# Patient Record
Sex: FEMALE | Race: WHITE | Employment: FULL TIME | URBAN - METROPOLITAN AREA
[De-identification: names, ages, dates, MRNs, and addresses within clinical notes are randomized per-mention and may not be internally consistent; named-entity substitution may affect disease eponyms.]

---

## 2017-04-21 PROBLEM — F41.9 ANXIETY: Status: ACTIVE | Noted: 2017-04-21

## 2017-07-07 ENCOUNTER — HOSPITAL ENCOUNTER (OUTPATIENT)
Dept: MAMMOGRAPHY | Age: 52
Discharge: HOME | End: 2017-07-07
Attending: FAMILY MEDICINE
Payer: COMMERCIAL

## 2017-07-07 DIAGNOSIS — Z12.39 SCREENING FOR BREAST CANCER: ICD-10-CM

## 2017-07-07 PROCEDURE — 77067 SCR MAMMO BI INCL CAD: CPT

## 2017-07-28 ENCOUNTER — HOSPITAL ENCOUNTER (OUTPATIENT)
Dept: LAB | Age: 52
Discharge: HOME | End: 2017-07-28

## 2017-07-28 PROCEDURE — 88305 TISSUE EXAM BY PATHOLOGIST: CPT | Performed by: INTERNAL MEDICINE

## 2018-07-20 ENCOUNTER — HOSPITAL ENCOUNTER (OUTPATIENT)
Dept: MAMMOGRAPHY | Age: 53
Discharge: HOME | End: 2018-07-20
Attending: FAMILY MEDICINE
Payer: COMMERCIAL

## 2018-07-20 DIAGNOSIS — Z12.39 SCREENING FOR BREAST CANCER: ICD-10-CM

## 2018-07-20 PROCEDURE — 77067 SCR MAMMO BI INCL CAD: CPT

## 2019-09-13 ENCOUNTER — HOSPITAL ENCOUNTER (OUTPATIENT)
Dept: MAMMOGRAPHY | Age: 54
Discharge: HOME | End: 2019-09-13
Attending: FAMILY MEDICINE
Payer: COMMERCIAL

## 2019-09-13 DIAGNOSIS — Z12.39 SCREENING FOR MALIGNANT NEOPLASM OF BREAST: ICD-10-CM

## 2019-09-13 PROCEDURE — 77067 SCR MAMMO BI INCL CAD: CPT

## 2019-09-19 ENCOUNTER — HOSPITAL ENCOUNTER (OUTPATIENT)
Dept: MAMMOGRAPHY | Age: 54
Discharge: HOME | End: 2019-09-19
Attending: FAMILY MEDICINE
Payer: COMMERCIAL

## 2019-09-19 DIAGNOSIS — R92.8 ABNORMAL SCREENING MAMMOGRAM: ICD-10-CM

## 2019-09-19 PROCEDURE — 77065 DX MAMMO INCL CAD UNI: CPT

## 2021-11-29 ENCOUNTER — HOSPITAL ENCOUNTER (EMERGENCY)
Age: 56
Discharge: LEFT BEFORE DISCHARGE | End: 2021-11-29
Payer: COMMERCIAL

## 2021-11-29 PROCEDURE — 75810000275 HC EMERGENCY DEPT VISIT NO LEVEL OF CARE

## 2021-12-27 ENCOUNTER — HOSPITAL ENCOUNTER (OUTPATIENT)
Dept: MAMMOGRAPHY | Age: 56
Discharge: HOME | End: 2021-12-27
Attending: FAMILY MEDICINE
Payer: COMMERCIAL

## 2021-12-27 DIAGNOSIS — Z12.31 SCREENING MAMMOGRAM FOR HIGH-RISK PATIENT: ICD-10-CM

## 2021-12-27 PROCEDURE — 77067 SCR MAMMO BI INCL CAD: CPT

## 2022-03-19 PROBLEM — F41.9 ANXIETY: Status: ACTIVE | Noted: 2017-04-21

## 2022-10-11 ENCOUNTER — TELEPHONE (OUTPATIENT)
Dept: FAMILY MEDICINE CLINIC | Facility: CLINIC | Age: 57
End: 2022-10-11

## 2022-10-11 NOTE — TELEPHONE ENCOUNTER
Pt called stating that she has a sinus infection. No fever, no sore throat, just a lot of congestion with yellow/green mucus. Pt was wanting something to be called in. Attempted to call pt to inform she needs to be evaluated here ot urgent care. Unable to leave message. Can pt do a Evisit? Or urgent care.

## 2022-11-14 NOTE — TELEPHONE ENCOUNTER
----- Message from Anya Scott sent at 11/14/2022  9:07 AM EST -----  Subject: Refill Request    QUESTIONS  Name of Medication? sertraline (ZOLOFT) 50 MG tablet  Patient-reported dosage and instructions? 1 per day  How many days do you have left? 10  Preferred Pharmacy? [facility] [address] phone number (if available)? [phone]  ---------------------------------------------------------------------------  --------------,  Name of Medication? traZODone (DESYREL) 150 MG tablet  Patient-reported dosage and instructions? 1 per day  How many days do you have left? 10  Preferred Pharmacy? [facility] [address] phone number (if available)? [phone]  ---------------------------------------------------------------------------  --------------  Sree MORENO  What is the best way for the office to contact you? OK to leave message on   voicemail  Preferred Call Back Phone Number? [phone]  ---------------------------------------------------------------------------  --------------  SCRIPT ANSWERS  Relationship to Patient?  Self

## 2022-11-23 ENCOUNTER — TELEPHONE (OUTPATIENT)
Dept: FAMILY MEDICINE CLINIC | Facility: CLINIC | Age: 57
End: 2022-11-23

## 2022-11-23 RX ORDER — TRAZODONE HYDROCHLORIDE 150 MG/1
150 TABLET ORAL NIGHTLY
Qty: 30 TABLET | Refills: 0 | Status: SHIPPED | OUTPATIENT
Start: 2022-11-23

## 2022-11-23 NOTE — TELEPHONE ENCOUNTER
Pharmacist with [facility] called asking for a new Rx for the Zoloft. States that they last Rx has 2 different directions on it.

## 2022-12-05 ENCOUNTER — NURSE ONLY (OUTPATIENT)
Dept: FAMILY MEDICINE CLINIC | Facility: CLINIC | Age: 57
End: 2022-12-05

## 2022-12-05 ENCOUNTER — OFFICE VISIT (OUTPATIENT)
Dept: FAMILY MEDICINE CLINIC | Facility: CLINIC | Age: 57
End: 2022-12-05
Payer: COMMERCIAL

## 2022-12-05 VITALS
TEMPERATURE: 97.6 F | OXYGEN SATURATION: 92 % | WEIGHT: 188.2 LBS | BODY MASS INDEX: 34.63 KG/M2 | RESPIRATION RATE: 16 BRPM | DIASTOLIC BLOOD PRESSURE: 81 MMHG | SYSTOLIC BLOOD PRESSURE: 137 MMHG | HEIGHT: 62 IN | HEART RATE: 72 BPM

## 2022-12-05 DIAGNOSIS — E78.00 HIGH CHOLESTEROL: ICD-10-CM

## 2022-12-05 DIAGNOSIS — Z13.0 SCREENING, ANEMIA, DEFICIENCY, IRON: ICD-10-CM

## 2022-12-05 DIAGNOSIS — Z13.1 SCREENING FOR DIABETES MELLITUS: ICD-10-CM

## 2022-12-05 DIAGNOSIS — F41.9 ANXIETY: Primary | ICD-10-CM

## 2022-12-05 DIAGNOSIS — F51.01 PRIMARY INSOMNIA: ICD-10-CM

## 2022-12-05 DIAGNOSIS — Z12.31 ENCOUNTER FOR SCREENING MAMMOGRAM FOR MALIGNANT NEOPLASM OF BREAST: ICD-10-CM

## 2022-12-05 LAB
BASOPHILS # BLD: 0.1 K/UL (ref 0–0.2)
BASOPHILS NFR BLD: 1 % (ref 0–2)
DIFFERENTIAL METHOD BLD: ABNORMAL
EOSINOPHIL # BLD: 0.1 K/UL (ref 0–0.8)
EOSINOPHIL NFR BLD: 2 % (ref 0.5–7.8)
ERYTHROCYTE [DISTWIDTH] IN BLOOD BY AUTOMATED COUNT: 11.6 % (ref 11.9–14.6)
HCT VFR BLD AUTO: 45 % (ref 35.8–46.3)
HGB BLD-MCNC: 14.8 G/DL (ref 11.7–15.4)
IMM GRANULOCYTES # BLD AUTO: 0 K/UL (ref 0–0.5)
IMM GRANULOCYTES NFR BLD AUTO: 0 % (ref 0–5)
LYMPHOCYTES # BLD: 2.5 K/UL (ref 0.5–4.6)
LYMPHOCYTES NFR BLD: 33 % (ref 13–44)
MCH RBC QN AUTO: 32.1 PG (ref 26.1–32.9)
MCHC RBC AUTO-ENTMCNC: 32.9 G/DL (ref 31.4–35)
MCV RBC AUTO: 97.6 FL (ref 82–102)
MONOCYTES # BLD: 0.4 K/UL (ref 0.1–1.3)
MONOCYTES NFR BLD: 6 % (ref 4–12)
NEUTS SEG # BLD: 4.3 K/UL (ref 1.7–8.2)
NEUTS SEG NFR BLD: 58 % (ref 43–78)
NRBC # BLD: 0 K/UL (ref 0–0.2)
PLATELET # BLD AUTO: 271 K/UL (ref 150–450)
PMV BLD AUTO: 10.4 FL (ref 9.4–12.3)
RBC # BLD AUTO: 4.61 M/UL (ref 4.05–5.2)
WBC # BLD AUTO: 7.4 K/UL (ref 4.3–11.1)

## 2022-12-05 PROCEDURE — 99214 OFFICE O/P EST MOD 30 MIN: CPT | Performed by: FAMILY MEDICINE

## 2022-12-05 RX ORDER — TRAZODONE HYDROCHLORIDE 150 MG/1
150 TABLET ORAL NIGHTLY
Qty: 90 TABLET | Refills: 3 | Status: SHIPPED | OUTPATIENT
Start: 2022-12-05

## 2022-12-05 ASSESSMENT — PATIENT HEALTH QUESTIONNAIRE - PHQ9
SUM OF ALL RESPONSES TO PHQ QUESTIONS 1-9: 0
1. LITTLE INTEREST OR PLEASURE IN DOING THINGS: 0
SUM OF ALL RESPONSES TO PHQ QUESTIONS 1-9: 0
2. FEELING DOWN, DEPRESSED OR HOPELESS: 0
SUM OF ALL RESPONSES TO PHQ9 QUESTIONS 1 & 2: 0

## 2022-12-05 NOTE — PROGRESS NOTES
[address] [facility] [address], [address]  Phone: [phone] Fax [phone]  Ryley Alonso MD  2022           Ms. Oliveira  is a 61y.o.  year old  female patient who comes in for refills. She is doing very well on the Zoloft for anxiety. She also takes trazodone for sleep and it really helps her sleep. Her health has been good overall. No depression. No thoughts of suicide. Ms. Joselin Lyons  has  has a past medical history of Menopause and PMS (premenstrual syndrome). Ms. Joselin Lyons  has  has a past surgical history that includes orthopedic surgery (Right, 4/3/15);  section (); and gyn. Ms. Joselin Lyons   Current Outpatient Medications   Medication Sig Dispense Refill    sertraline (ZOLOFT) 50 MG tablet One and a half tabs po qd 90 tablet 3    traZODone (DESYREL) 150 MG tablet Take 1 tablet by mouth nightly One-half to one po qhs prn 90 tablet 3     No current facility-administered medications for this visit. Ms. Freya Jesus History     Socioeconomic History    Marital status:      Spouse name: None    Number of children: None    Years of education: None    Highest education level: None   Tobacco Use    Smoking status: Never    Smokeless tobacco: Never   Substance and Sexual Activity    Alcohol use: No     Alcohol/week: 0.0 standard drinks    Drug use: No       Ms. Oliveira   Family History   Problem Relation Age of Onset    Heart Disease Father     Breast Cancer Paternal Aunt 48    Thyroid Disease Mother             Ms. Joselin Lyons  has the following allergies: Allergies   Allergen Reactions    Nitrofurantoin Other (See Comments)       /81   Pulse 72   Temp 97.6 °F (36.4 °C)   Resp 16   Ht 5' 2\" (1.575 m)   Wt 188 lb 3.2 oz (85.4 kg)   SpO2 92%   BMI 34.42 kg/m²     HEENT: Normocephalic, atraumatic, pupils equal and reactive to light. Neck: Supple, no masses or thyromegaly.   Lungs: clear to auscultation bilaterally. CV: regular rate and rhythm, without murmurs, rubs, or gallops  Ext: No lower extremity edema. Ana Gupta was seen today for anxiety and medication refill. Diagnoses and all orders for this visit:    Anxiety  -     sertraline (ZOLOFT) 50 MG tablet; One and a half tabs po qd    Encounter for screening mammogram for malignant neoplasm of breast  -     [facility]ERR[facility] DIGITAL SCREEN W OR WO CAD BILATERAL; Future    Primary insomnia  -     traZODone (DESYREL) 150 MG tablet; Take 1 tablet by mouth nightly One-half to one po qhs prn    High cholesterol  -     Lipid Panel; Future    Screening for diabetes mellitus  -     Comprehensive Metabolic Panel; Future    Screening, anemia, deficiency, iron  -     CBC with Auto Differential; Future    She will return for Pap smear. Go for labs today.   Heidi Chambers MD

## 2022-12-06 LAB
ALBUMIN SERPL-MCNC: 4.1 G/DL (ref 3.5–5)
ALBUMIN/GLOB SERPL: 1.5 {RATIO} (ref 0.4–1.6)
ALP SERPL-CCNC: 125 U/L (ref 50–136)
ALT SERPL-CCNC: 37 U/L (ref 12–65)
ANION GAP SERPL CALC-SCNC: 3 MMOL/L (ref 2–11)
AST SERPL-CCNC: 22 U/L (ref 15–37)
BILIRUB SERPL-MCNC: 0.5 MG/DL (ref 0.2–1.1)
BUN SERPL-MCNC: 9 MG/DL (ref 6–23)
CALCIUM SERPL-MCNC: 9.1 MG/DL (ref 8.3–10.4)
CHLORIDE SERPL-SCNC: 104 MMOL/L (ref 101–110)
CHOLEST SERPL-MCNC: 218 MG/DL
CO2 SERPL-SCNC: 31 MMOL/L (ref 21–32)
CREAT SERPL-MCNC: 0.6 MG/DL (ref 0.6–1)
GLOBULIN SER CALC-MCNC: 2.7 G/DL (ref 2.8–4.5)
GLUCOSE SERPL-MCNC: 91 MG/DL (ref 65–100)
HDLC SERPL-MCNC: 52 MG/DL (ref 40–60)
HDLC SERPL: 4.2 {RATIO}
LDLC SERPL CALC-MCNC: 145.6 MG/DL
POTASSIUM SERPL-SCNC: 4.4 MMOL/L (ref 3.5–5.1)
PROT SERPL-MCNC: 6.8 G/DL (ref 6.3–8.2)
SODIUM SERPL-SCNC: 138 MMOL/L (ref 133–143)
TRIGL SERPL-MCNC: 102 MG/DL (ref 35–150)
VLDLC SERPL CALC-MCNC: 20.4 MG/DL (ref 6–23)

## 2022-12-06 NOTE — RESULT ENCOUNTER NOTE
Let patient know labs normal except cholesterol is slightly high. Watch fats in diet and recheck one year.

## 2022-12-27 DIAGNOSIS — F51.01 PRIMARY INSOMNIA: ICD-10-CM

## 2022-12-27 RX ORDER — TRAZODONE HYDROCHLORIDE 150 MG/1
150 TABLET ORAL NIGHTLY
Qty: 90 TABLET | Refills: 3 | Status: SHIPPED | OUTPATIENT
Start: 2022-12-27

## 2022-12-27 NOTE — TELEPHONE ENCOUNTER
Pharmacist with [facility] called stating that they are needing a new Rx for Trazodone to be sent in for pt. Last Rx has 2 different directions on it.

## 2023-01-10 ENCOUNTER — OFFICE VISIT (OUTPATIENT)
Dept: FAMILY MEDICINE CLINIC | Facility: CLINIC | Age: 58
End: 2023-01-10
Payer: COMMERCIAL

## 2023-01-10 VITALS
TEMPERATURE: 97.2 F | HEIGHT: 62 IN | DIASTOLIC BLOOD PRESSURE: 78 MMHG | SYSTOLIC BLOOD PRESSURE: 140 MMHG | OXYGEN SATURATION: 95 % | WEIGHT: 184 LBS | HEART RATE: 69 BPM | BODY MASS INDEX: 33.86 KG/M2 | RESPIRATION RATE: 16 BRPM

## 2023-01-10 DIAGNOSIS — R39.15 URINARY URGENCY: ICD-10-CM

## 2023-01-10 DIAGNOSIS — Z00.00 ROUTINE GENERAL MEDICAL EXAMINATION AT A HEALTH CARE FACILITY: Primary | ICD-10-CM

## 2023-01-10 DIAGNOSIS — Z12.4 SCREENING FOR MALIGNANT NEOPLASM OF CERVIX: ICD-10-CM

## 2023-01-10 DIAGNOSIS — N32.81 OAB (OVERACTIVE BLADDER): ICD-10-CM

## 2023-01-10 LAB
APPEARANCE UR: CLEAR
BACTERIA URNS QL MICRO: NEGATIVE /HPF
BILIRUB UR QL: NEGATIVE
CASTS URNS QL MICRO: ABNORMAL /LPF (ref 0–2)
COLOR UR: ABNORMAL
EPI CELLS #/AREA URNS HPF: ABNORMAL /HPF (ref 0–5)
GLUCOSE UR STRIP.AUTO-MCNC: NEGATIVE MG/DL
HGB UR QL STRIP: NEGATIVE
KETONES UR QL STRIP.AUTO: NEGATIVE MG/DL
LEUKOCYTE ESTERASE UR QL STRIP.AUTO: ABNORMAL
MUCOUS THREADS URNS QL MICRO: 0 /LPF
NITRITE UR QL STRIP.AUTO: NEGATIVE
PH UR STRIP: 7 (ref 5–9)
PROT UR STRIP-MCNC: NEGATIVE MG/DL
RBC #/AREA URNS HPF: ABNORMAL /HPF (ref 0–5)
SP GR UR REFRACTOMETRY: 1.02 (ref 1–1.02)
URINE CULTURE IF INDICATED: ABNORMAL
UROBILINOGEN UR QL STRIP.AUTO: 0.2 EU/DL (ref 0.2–1)
WBC URNS QL MICRO: ABNORMAL /HPF (ref 0–4)

## 2023-01-10 PROCEDURE — 99396 PREV VISIT EST AGE 40-64: CPT | Performed by: FAMILY MEDICINE

## 2023-01-10 PROCEDURE — 99214 OFFICE O/P EST MOD 30 MIN: CPT | Performed by: FAMILY MEDICINE

## 2023-01-10 RX ORDER — TOLTERODINE 4 MG/1
4 CAPSULE, EXTENDED RELEASE ORAL DAILY
Qty: 90 CAPSULE | Refills: 3 | Status: SHIPPED | OUTPATIENT
Start: 2023-01-10

## 2023-01-10 ASSESSMENT — PATIENT HEALTH QUESTIONNAIRE - PHQ9
SUM OF ALL RESPONSES TO PHQ QUESTIONS 1-9: 0
2. FEELING DOWN, DEPRESSED OR HOPELESS: 0
SUM OF ALL RESPONSES TO PHQ9 QUESTIONS 1 & 2: 0
SUM OF ALL RESPONSES TO PHQ QUESTIONS 1-9: 0
1. LITTLE INTEREST OR PLEASURE IN DOING THINGS: 0

## 2023-01-10 NOTE — PROGRESS NOTES
[address] [facility] [address]  Phone: [phone] Fax [phone]  Emerson Wagner MD          Ms. Oliveira  is a 61y.o.  year old  female patient who comes in for complete physical. She has been doing well. No fatigue, weight loss, weight gain. No shortness of breath, chest pain, palpitations. No abdominal pain, diarrhea, or constipation. No abnormal skin changes. Has  been working on diet and exercise. Feels well. ROS:  Feeling well. No dyspnea or chest pain on exertion. No abdominal pain, change in bowel habits, black or bloody stools. GYN ROS: no breast pain or new or enlarging lumps on self exam. Menopausal symptoms: none. No neurological complaints. Last DEXA scan and T-score: none  Last Colonoscopy:   Last Mammogram:   Last PAP:     The past month she has noticed some urinary urgency. She has noticed a little leakage as well. No fevers or burning. She has never had trouble with her bladder before. Ms. Herrera Gerard  has  has a past medical history of Menopause and PMS (premenstrual syndrome). Ms. Herrera Gerard  has  has a past surgical history that includes orthopedic surgery (Right, 4/3/15);  section (); and gyn. Ms. Herrera Gerard   Current Outpatient Medications   Medication Sig Dispense Refill    tolterodine (DETROL LA) 4 MG extended release capsule Take 1 capsule by mouth daily 90 capsule 3    traZODone (DESYREL) 150 MG tablet Take 1 tablet by mouth nightly One-half to one po qhs prn 90 tablet 3    sertraline (ZOLOFT) 50 MG tablet One and a half tabs po qd 90 tablet 3     No current facility-administered medications for this visit. Ms. Herrera Gerard family history includes Breast Cancer (age of onset: 48) in her paternal aunt; Heart Disease in her father; Thyroid Disease in her mother.       Ms. Zabrina Tinsleyut History     Socioeconomic History    Marital status:      Spouse name: Not on file    Number of children: Not on file    Years of education: Not on file    Highest education level: Not on file   Occupational History    Not on file   Tobacco Use    Smoking status: Never    Smokeless tobacco: Never   Substance and Sexual Activity    Alcohol use: No     Alcohol/week: 0.0 standard drinks    Drug use: No    Sexual activity: Not on file   Other Topics Concern    Not on file   Social History Narrative    Not on file     Social Determinants of Health     Financial Resource Strain: Not on file   Food Insecurity: Not on file   Transportation Needs: Not on file   Physical Activity: Not on file   Stress: Not on file   Social Connections: Not on file   Intimate Partner Violence: Not on file   Housing Stability: Not on file         Ms. Oliveira  has the following allergies: Allergies   Allergen Reactions    Nitrofurantoin Other (See Comments)       BP (!) 140/78   Pulse 69   Temp 97.2 °F (36.2 °C)   Resp 16   Ht 5' 2\" (1.575 m)   Wt 184 lb (83.5 kg)   SpO2 95%   BMI 33.65 kg/m²     General: alert, oriented x 3, pleasant. HEENT: Normocephalic, atraumatic, pupils equal and reactive to light. Neck: Supple, no masses or thyromegaly or lymphadenopathy. Lungs: clear to auscultation bilaterally without wheezing or rhonchi. CV: regular rate and rhythm, without murmurs, rubs, or gallops. Abdomen: soft, nontender, nondistended, no masses. No hepatosplenomegaly. No abdominal bruits. Ext: No lower extremity edema. No lesions about the feet. Breasts: no masses, no nipple discharge, no axillary lymphadenopathy of either breast.  Pelvic: normal female external genitalia, normal cervix and vagina visually. Pap smear done. Skin: no lesions. Jamal Leo was seen today for gynecologic exam.    Diagnoses and all orders for this visit:    Routine general medical examination at a health care facility    Screening for malignant neoplasm of cervix  -     IGP, Aptima HPV;  Future    Urinary urgency  -     Cancel: AMB POC URINALYSIS DIP STICK AUTO W/O MICRO  -     Urinalysis with Reflex to Culture; Future    OAB (overactive bladder)  -     tolterodine (DETROL LA) 4 MG extended release capsule; Take 1 capsule by mouth daily      Check urine today. Treat if needed for infection. If no infection consider overactive bladder treatment. Detrol LA given and gone over. She will be sure to go for mammogram.  She will consider the shingles vaccine and the COVID-vaccine and flu vaccine. Blood work was done last month and was gone over with the patient. We will do it again next year. 30 minutes of moderate aerobic exercise 5 days a week and green leafy vegetables daily for cardiovascular risk reduction.       Uma Zavaleta MD

## 2023-01-13 LAB
CYTOLOGIST CVX/VAG CYTO: NORMAL
CYTOLOGY CVX/VAG DOC THIN PREP: NORMAL
HPV APTIMA: NEGATIVE
Lab: NORMAL
PATH REPORT.FINAL DX SPEC: NORMAL
STAT OF ADQ CVX/VAG CYTO-IMP: NORMAL

## 2023-02-10 ENCOUNTER — HOSPITAL ENCOUNTER (OUTPATIENT)
Dept: MAMMOGRAPHY | Age: 58
Discharge: HOME | End: 2023-02-10
Payer: COMMERCIAL

## 2023-02-10 DIAGNOSIS — Z12.31 ENCOUNTER FOR SCREENING MAMMOGRAM FOR MALIGNANT NEOPLASM OF BREAST: ICD-10-CM

## 2023-02-10 PROCEDURE — 77067 SCR MAMMO BI INCL CAD: CPT

## 2023-05-24 ENCOUNTER — OFFICE VISIT (OUTPATIENT)
Dept: FAMILY MEDICINE CLINIC | Facility: CLINIC | Age: 58
End: 2023-05-24
Payer: COMMERCIAL

## 2023-05-24 VITALS
RESPIRATION RATE: 18 BRPM | SYSTOLIC BLOOD PRESSURE: 147 MMHG | TEMPERATURE: 98.3 F | HEIGHT: 62 IN | HEART RATE: 71 BPM | OXYGEN SATURATION: 95 % | BODY MASS INDEX: 34.6 KG/M2 | DIASTOLIC BLOOD PRESSURE: 83 MMHG | WEIGHT: 188 LBS

## 2023-05-24 DIAGNOSIS — M76.32 ILIOTIBIAL BAND TENDINITIS OF LEFT SIDE: ICD-10-CM

## 2023-05-24 DIAGNOSIS — M70.62 TROCHANTERIC BURSITIS OF LEFT HIP: Primary | ICD-10-CM

## 2023-05-24 PROCEDURE — 99213 OFFICE O/P EST LOW 20 MIN: CPT | Performed by: PHYSICIAN ASSISTANT

## 2023-05-24 RX ORDER — NAPROXEN 500 MG/1
500 TABLET ORAL 2 TIMES DAILY WITH MEALS
Qty: 30 TABLET | Refills: 1 | Status: SHIPPED | OUTPATIENT
Start: 2023-05-24

## 2023-05-24 SDOH — ECONOMIC STABILITY: FOOD INSECURITY: WITHIN THE PAST 12 MONTHS, YOU WORRIED THAT YOUR FOOD WOULD RUN OUT BEFORE YOU GOT MONEY TO BUY MORE.: NEVER TRUE

## 2023-05-24 SDOH — ECONOMIC STABILITY: FOOD INSECURITY: WITHIN THE PAST 12 MONTHS, THE FOOD YOU BOUGHT JUST DIDN'T LAST AND YOU DIDN'T HAVE MONEY TO GET MORE.: NEVER TRUE

## 2023-05-24 SDOH — ECONOMIC STABILITY: HOUSING INSECURITY
IN THE LAST 12 MONTHS, WAS THERE A TIME WHEN YOU DID NOT HAVE A STEADY PLACE TO SLEEP OR SLEPT IN A SHELTER (INCLUDING NOW)?: NO

## 2023-05-24 SDOH — ECONOMIC STABILITY: INCOME INSECURITY: HOW HARD IS IT FOR YOU TO PAY FOR THE VERY BASICS LIKE FOOD, HOUSING, MEDICAL CARE, AND HEATING?: NOT HARD AT ALL

## 2023-05-24 ASSESSMENT — ENCOUNTER SYMPTOMS
BACK PAIN: 0
COLOR CHANGE: 0

## 2023-05-24 ASSESSMENT — PATIENT HEALTH QUESTIONNAIRE - PHQ9
SUM OF ALL RESPONSES TO PHQ QUESTIONS 1-9: 0
SUM OF ALL RESPONSES TO PHQ9 QUESTIONS 1 & 2: 0
SUM OF ALL RESPONSES TO PHQ QUESTIONS 1-9: 0
1. LITTLE INTEREST OR PLEASURE IN DOING THINGS: 0
2. FEELING DOWN, DEPRESSED OR HOPELESS: 0

## 2023-05-24 NOTE — PATIENT INSTRUCTIONS
*Take the antiinflammatory as prescribed for the next 2 weeks. *Recommend alternating ice (for 10 min) and heat (for 30 min), 3-4 times a day as able/needed. *Try the stretches daily.

## 2023-05-24 NOTE — PROGRESS NOTES
[facility] [address]  Phone [phone]      Patient: Harrison Hart  YOB: 1965  Age 61 y.o. Sex female  Medical Record:  [id number]  Visit Date: 05/24/23  Author:  Alyssa Hernandez PA-C    Family Practice Clinic Note    Chief Complaint   Patient presents with    Hip Pain     Left hip, started the day before yesterday, does not recall doing anything in particular, tender to touch, denies previous injury to left hip. History of Present Illness  This is a 62 yo female who presents today with complaints of left lateral hip pain which has been present now for the past 2 to 3 days. She denies any preceding injury or trauma. Notes that the pain is primarily at the lateral aspect of the hip and into the buttock area. She denies radiation of the pain down the leg. Denies numbness, tingling or weakness of the lower extremity. She has not noted any overlying rash or skin changes. Notes tenderness to palpation of the lateral hip area. Notes that she sits a lot at work, using a computer. Admits that she will often sit crisscross legged in her chair at work (with her feet under her in the seat). Past History:    Past Medical history   Past Medical History:   Diagnosis Date    Menopause     PMS (premenstrual syndrome) 10/29/2013    Weight gain       Current Problem List:   Patient Active Problem List   Diagnosis    Anxiety    Hx of abnormal cervical Pap smear    Low serum vitamin D    Primary insomnia       Current Medications: .   Current Outpatient Medications   Medication Sig Dispense Refill    traZODone (DESYREL) 150 MG tablet Take 1 tablet by mouth nightly One-half to one po qhs prn 90 tablet 3    sertraline (ZOLOFT) 50 MG tablet One and a half tabs po qd 90 tablet 3    tolterodine (DETROL LA) 4 MG extended release capsule Take 1 capsule by mouth daily (Patient not taking: Reported on 5/24/2023) 90 capsule 3     No current

## 2023-09-25 ENCOUNTER — OFFICE VISIT (OUTPATIENT)
Dept: FAMILY MEDICINE CLINIC | Facility: CLINIC | Age: 58
End: 2023-09-25
Payer: COMMERCIAL

## 2023-09-25 VITALS
OXYGEN SATURATION: 97 % | HEIGHT: 62 IN | HEART RATE: 81 BPM | SYSTOLIC BLOOD PRESSURE: 128 MMHG | RESPIRATION RATE: 16 BRPM | TEMPERATURE: 97.6 F | WEIGHT: 185 LBS | DIASTOLIC BLOOD PRESSURE: 67 MMHG | BODY MASS INDEX: 34.04 KG/M2

## 2023-09-25 DIAGNOSIS — R05.1 ACUTE COUGH: ICD-10-CM

## 2023-09-25 DIAGNOSIS — H66.003 NON-RECURRENT ACUTE SUPPURATIVE OTITIS MEDIA OF BOTH EARS WITHOUT SPONTANEOUS RUPTURE OF TYMPANIC MEMBRANES: ICD-10-CM

## 2023-09-25 DIAGNOSIS — U07.1 COVID: Primary | ICD-10-CM

## 2023-09-25 DIAGNOSIS — R50.9 FEVER, UNSPECIFIED FEVER CAUSE: ICD-10-CM

## 2023-09-25 DIAGNOSIS — R11.0 NAUSEA: ICD-10-CM

## 2023-09-25 LAB
EXP DATE SOLUTION: NORMAL
EXP DATE SWAB: NORMAL
EXPIRATION DATE: NORMAL
LOT NUMBER POC: NORMAL
LOT NUMBER SOLUTION: NORMAL
LOT NUMBER SWAB: NORMAL
SARS-COV-2 RNA, POC: POSITIVE

## 2023-09-25 PROCEDURE — 87635 SARS-COV-2 COVID-19 AMP PRB: CPT | Performed by: FAMILY MEDICINE

## 2023-09-25 PROCEDURE — 99214 OFFICE O/P EST MOD 30 MIN: CPT | Performed by: FAMILY MEDICINE

## 2023-09-25 RX ORDER — BENZONATATE 100 MG/1
CAPSULE ORAL
Qty: 20 CAPSULE | Refills: 0 | Status: SHIPPED | OUTPATIENT
Start: 2023-09-25

## 2023-09-25 RX ORDER — ONDANSETRON 4 MG/1
4 TABLET, ORALLY DISINTEGRATING ORAL 3 TIMES DAILY PRN
Qty: 21 TABLET | Refills: 0 | Status: SHIPPED | OUTPATIENT
Start: 2023-09-25

## 2023-09-25 RX ORDER — AMOXICILLIN 875 MG/1
875 TABLET, COATED ORAL 2 TIMES DAILY
Qty: 20 TABLET | Refills: 0 | Status: SHIPPED | OUTPATIENT
Start: 2023-09-25 | End: 2023-10-05

## 2023-09-25 ASSESSMENT — PATIENT HEALTH QUESTIONNAIRE - PHQ9
SUM OF ALL RESPONSES TO PHQ QUESTIONS 1-9: 0
SUM OF ALL RESPONSES TO PHQ9 QUESTIONS 1 & 2: 0
1. LITTLE INTEREST OR PLEASURE IN DOING THINGS: 0
SUM OF ALL RESPONSES TO PHQ QUESTIONS 1-9: 0
SUM OF ALL RESPONSES TO PHQ QUESTIONS 1-9: 0
2. FEELING DOWN, DEPRESSED OR HOPELESS: 0
SUM OF ALL RESPONSES TO PHQ QUESTIONS 1-9: 0

## 2024-01-03 ENCOUNTER — TELEPHONE (OUTPATIENT)
Dept: FAMILY MEDICINE CLINIC | Facility: CLINIC | Age: 59
End: 2024-01-03

## 2024-01-03 DIAGNOSIS — E78.00 HIGH CHOLESTEROL: ICD-10-CM

## 2024-01-03 DIAGNOSIS — Z13.0 SCREENING, ANEMIA, DEFICIENCY, IRON: ICD-10-CM

## 2024-01-03 DIAGNOSIS — Z13.1 SCREENING FOR DIABETES MELLITUS: Primary | ICD-10-CM

## 2024-01-05 ENCOUNTER — NURSE ONLY (OUTPATIENT)
Dept: FAMILY MEDICINE CLINIC | Facility: CLINIC | Age: 59
End: 2024-01-05

## 2024-01-05 DIAGNOSIS — Z13.0 SCREENING, ANEMIA, DEFICIENCY, IRON: ICD-10-CM

## 2024-01-05 DIAGNOSIS — E78.00 HIGH CHOLESTEROL: ICD-10-CM

## 2024-01-05 DIAGNOSIS — Z13.1 SCREENING FOR DIABETES MELLITUS: ICD-10-CM

## 2024-01-05 LAB
ALBUMIN SERPL-MCNC: 3.6 G/DL (ref 3.5–5)
ALBUMIN/GLOB SERPL: 1.1 (ref 0.4–1.6)
ALP SERPL-CCNC: 129 U/L (ref 50–136)
ALT SERPL-CCNC: 31 U/L (ref 12–65)
ANION GAP SERPL CALC-SCNC: 1 MMOL/L (ref 2–11)
AST SERPL-CCNC: 24 U/L (ref 15–37)
BASOPHILS # BLD: 0.1 K/UL (ref 0–0.2)
BASOPHILS NFR BLD: 1 % (ref 0–2)
BILIRUB SERPL-MCNC: 0.7 MG/DL (ref 0.2–1.1)
BUN SERPL-MCNC: 7 MG/DL (ref 6–23)
CALCIUM SERPL-MCNC: 8.7 MG/DL (ref 8.3–10.4)
CHLORIDE SERPL-SCNC: 106 MMOL/L (ref 103–113)
CHOLEST SERPL-MCNC: 192 MG/DL
CO2 SERPL-SCNC: 31 MMOL/L (ref 21–32)
CREAT SERPL-MCNC: 0.7 MG/DL (ref 0.6–1)
DIFFERENTIAL METHOD BLD: ABNORMAL
EOSINOPHIL # BLD: 0.1 K/UL (ref 0–0.8)
EOSINOPHIL NFR BLD: 2 % (ref 0.5–7.8)
ERYTHROCYTE [DISTWIDTH] IN BLOOD BY AUTOMATED COUNT: 11.6 % (ref 11.9–14.6)
GLOBULIN SER CALC-MCNC: 3.3 G/DL (ref 2.8–4.5)
GLUCOSE SERPL-MCNC: 107 MG/DL (ref 65–100)
HCT VFR BLD AUTO: 44.9 % (ref 35.8–46.3)
HDLC SERPL-MCNC: 53 MG/DL (ref 40–60)
HDLC SERPL: 3.6
HGB BLD-MCNC: 14.6 G/DL (ref 11.7–15.4)
IMM GRANULOCYTES # BLD AUTO: 0 K/UL (ref 0–0.5)
IMM GRANULOCYTES NFR BLD AUTO: 0 % (ref 0–5)
LDLC SERPL CALC-MCNC: 123.8 MG/DL
LYMPHOCYTES # BLD: 2 K/UL (ref 0.5–4.6)
LYMPHOCYTES NFR BLD: 32 % (ref 13–44)
MCH RBC QN AUTO: 31.7 PG (ref 26.1–32.9)
MCHC RBC AUTO-ENTMCNC: 32.5 G/DL (ref 31.4–35)
MCV RBC AUTO: 97.6 FL (ref 82–102)
MONOCYTES # BLD: 0.4 K/UL (ref 0.1–1.3)
MONOCYTES NFR BLD: 7 % (ref 4–12)
NEUTS SEG # BLD: 3.6 K/UL (ref 1.7–8.2)
NEUTS SEG NFR BLD: 58 % (ref 43–78)
NRBC # BLD: 0 K/UL (ref 0–0.2)
PLATELET # BLD AUTO: 233 K/UL (ref 150–450)
PMV BLD AUTO: 10.5 FL (ref 9.4–12.3)
POTASSIUM SERPL-SCNC: 4.5 MMOL/L (ref 3.5–5.1)
PROT SERPL-MCNC: 6.9 G/DL (ref 6.3–8.2)
RBC # BLD AUTO: 4.6 M/UL (ref 4.05–5.2)
SODIUM SERPL-SCNC: 138 MMOL/L (ref 136–146)
TRIGL SERPL-MCNC: 76 MG/DL (ref 35–150)
VLDLC SERPL CALC-MCNC: 15.2 MG/DL (ref 6–23)
WBC # BLD AUTO: 6.1 K/UL (ref 4.3–11.1)

## 2024-01-12 ENCOUNTER — OFFICE VISIT (OUTPATIENT)
Dept: FAMILY MEDICINE CLINIC | Facility: CLINIC | Age: 59
End: 2024-01-12
Payer: COMMERCIAL

## 2024-01-12 VITALS
OXYGEN SATURATION: 96 % | HEART RATE: 87 BPM | SYSTOLIC BLOOD PRESSURE: 148 MMHG | BODY MASS INDEX: 34.23 KG/M2 | RESPIRATION RATE: 16 BRPM | HEIGHT: 62 IN | TEMPERATURE: 97.3 F | WEIGHT: 186 LBS | DIASTOLIC BLOOD PRESSURE: 81 MMHG

## 2024-01-12 DIAGNOSIS — F32.0 CURRENT MILD EPISODE OF MAJOR DEPRESSIVE DISORDER WITHOUT PRIOR EPISODE (HCC): ICD-10-CM

## 2024-01-12 DIAGNOSIS — Z12.31 ENCOUNTER FOR SCREENING MAMMOGRAM FOR MALIGNANT NEOPLASM OF BREAST: ICD-10-CM

## 2024-01-12 DIAGNOSIS — M54.32 LEFT SIDED SCIATICA: ICD-10-CM

## 2024-01-12 DIAGNOSIS — Z00.00 ROUTINE GENERAL MEDICAL EXAMINATION AT A HEALTH CARE FACILITY: Primary | ICD-10-CM

## 2024-01-12 DIAGNOSIS — F41.9 ANXIETY: ICD-10-CM

## 2024-01-12 DIAGNOSIS — F51.01 PRIMARY INSOMNIA: ICD-10-CM

## 2024-01-12 PROCEDURE — 99214 OFFICE O/P EST MOD 30 MIN: CPT | Performed by: FAMILY MEDICINE

## 2024-01-12 PROCEDURE — 99396 PREV VISIT EST AGE 40-64: CPT | Performed by: FAMILY MEDICINE

## 2024-01-12 RX ORDER — SERTRALINE HYDROCHLORIDE 100 MG/1
TABLET, FILM COATED ORAL
Qty: 90 TABLET | Refills: 3 | Status: SHIPPED | OUTPATIENT
Start: 2024-01-12

## 2024-01-12 RX ORDER — MELOXICAM 7.5 MG/1
7.5 TABLET ORAL DAILY
Qty: 30 TABLET | Refills: 3 | Status: SHIPPED | OUTPATIENT
Start: 2024-01-12

## 2024-01-12 RX ORDER — TRAZODONE HYDROCHLORIDE 150 MG/1
150 TABLET ORAL NIGHTLY
Qty: 90 TABLET | Refills: 3 | Status: SHIPPED | OUTPATIENT
Start: 2024-01-12

## 2024-01-12 ASSESSMENT — PATIENT HEALTH QUESTIONNAIRE - PHQ9
3. TROUBLE FALLING OR STAYING ASLEEP: 1
SUM OF ALL RESPONSES TO PHQ QUESTIONS 1-9: 6
7. TROUBLE CONCENTRATING ON THINGS, SUCH AS READING THE NEWSPAPER OR WATCHING TELEVISION: 0
SUM OF ALL RESPONSES TO PHQ QUESTIONS 1-9: 6
SUM OF ALL RESPONSES TO PHQ QUESTIONS 1-9: 6
SUM OF ALL RESPONSES TO PHQ9 QUESTIONS 1 & 2: 2
10. IF YOU CHECKED OFF ANY PROBLEMS, HOW DIFFICULT HAVE THESE PROBLEMS MADE IT FOR YOU TO DO YOUR WORK, TAKE CARE OF THINGS AT HOME, OR GET ALONG WITH OTHER PEOPLE: 1
6. FEELING BAD ABOUT YOURSELF - OR THAT YOU ARE A FAILURE OR HAVE LET YOURSELF OR YOUR FAMILY DOWN: 1
8. MOVING OR SPEAKING SO SLOWLY THAT OTHER PEOPLE COULD HAVE NOTICED. OR THE OPPOSITE, BEING SO FIGETY OR RESTLESS THAT YOU HAVE BEEN MOVING AROUND A LOT MORE THAN USUAL: 0
4. FEELING TIRED OR HAVING LITTLE ENERGY: 1
9. THOUGHTS THAT YOU WOULD BE BETTER OFF DEAD, OR OF HURTING YOURSELF: 0
2. FEELING DOWN, DEPRESSED OR HOPELESS: 1
5. POOR APPETITE OR OVEREATING: 1
1. LITTLE INTEREST OR PLEASURE IN DOING THINGS: 1
SUM OF ALL RESPONSES TO PHQ QUESTIONS 1-9: 6

## 2024-01-12 NOTE — PROGRESS NOTES
tablet; One po qd    Primary insomnia  -     traZODone (DESYREL) 150 MG tablet; Take 1 tablet by mouth nightly One-half to one po qhs prn    Encounter for screening mammogram for malignant neoplasm of breast  -     [facility] ELLIE DIGITAL SCREEN BILATERAL; Future    Left sided sciatica  -     meloxicam (MOBIC) 7.5 MG tablet; Take 1 tablet by mouth daily  -     BS - Physical Therapy, [facility]    Current mild episode of major depressive disorder without prior episode (HCC)  -     sertraline (ZOLOFT) 100 MG tablet; One po qd      Physical therapy and Mobic for her sciatica.  If not better months she is to let us know.    Declines vaccinations today.    Increase Zoloft.  Follow-up 3 months.  30 minutes of moderate aerobic exercise 5 days a week and green leafy vegetables daily for cardiovascular risk reduction.      Tracey Monge MD

## 2024-01-23 ENCOUNTER — HOSPITAL ENCOUNTER (OUTPATIENT)
Dept: PHYSICAL THERAPY | Age: 59
Setting detail: RECURRING SERIES
Discharge: HOME | End: 2024-01-26
Payer: COMMERCIAL

## 2024-01-23 DIAGNOSIS — M54.42 LOW BACK PAIN WITH LEFT-SIDED SCIATICA, UNSPECIFIED BACK PAIN LATERALITY, UNSPECIFIED CHRONICITY: Primary | ICD-10-CM

## 2024-01-23 DIAGNOSIS — R26.2 DIFFICULTY IN WALKING, NOT ELSEWHERE CLASSIFIED: ICD-10-CM

## 2024-01-23 DIAGNOSIS — M79.605 LEFT LEG PAIN: ICD-10-CM

## 2024-01-23 PROCEDURE — 97110 THERAPEUTIC EXERCISES: CPT

## 2024-01-23 PROCEDURE — 97140 MANUAL THERAPY 1/> REGIONS: CPT

## 2024-01-23 PROCEDURE — 97161 PT EVAL LOW COMPLEX 20 MIN: CPT

## 2024-01-23 ASSESSMENT — PAIN SCALES - GENERAL: PAINLEVEL_OUTOF10: 4

## 2024-01-23 NOTE — THERAPY EVALUATION
and does not extend below the knee. She exhibits lower back muscle tightness, point tenderness to L glute medius and L piriformis, and L hip/glute weakness. She is likely to benefit from PT intervention in order to reduce her pain and improve her quality of life in order to return to her baseline level of activity participation.  Therapy Problem List: (Impacting functional limitations):    Increased Pain, Decreased Strength, Decreased ROM, Decreased Functional Mobility, Decreased [place] with Home Exercise Program, Decreased Posture, Decreased Body Mechanics, and Decreased Activity Tolerance/Endurance*   Therapy Prognosis:   Good     Initial Assessment Complexity:   Low Complexity       PLAN   Effective Dates: 1/23/2024 TO Plan of Care/Certification Expiration Date: 03/05/24     Frequency/Duration: Plan Frequency: 2 visits per week for 6 weeks (Possible reduction to 1 visit per week pending symptom response to therapy)      Interventions Planned (Treatment may consist of any combination of the following):    Home Exercise Program (HEP), Manual Therapy, Neuromuscular Re-education/Strengthening, Range of Motion (ROM), Therapeutic Activites, Therapeutic Exercise/Strengthening, Patient/Caregiver Education & Training, and Dry Needling   Goals: (Goals have been discussed and agreed upon with patient.)  Short-Term Functional Goals: Time Frame: 3 weeks  Patient will report 25% improvement in L LE symptoms.  Patient will have 5/5 L hip abductor strength.  Patient will improve score on Oswestry Low Back Pain Questionnaire to <20% limited.  Discharge Goals: Time Frame: 6 weeks  Patient will report 50% improvement in L LE symptoms.  Patient will improve score on Oswestry Low Back Pain Questionnaire to <15% limited.  Patient will be independent with HEP.  Patient will be able to sleep without interruption from L LE pain.       Outcome Measure:   Tool Used: Modified Oswestry Low Back Pain Questionnaire  Score:  Initial:

## 2024-01-24 NOTE — PROGRESS NOTES
Natividad Oliveira  : 1965  Primary: Yudy Ramos (Commercial)  Secondary:  [facility] [place]  [address]  [facility] [address]  Phone: [phone]  Fax: [phone] Plan Frequency: 2 visits per week for 6 weeks (Possible reduction to 1 visit per week pending symptom response to therapy)    Plan of Care/Certification Expiration Date: 24        Plan of Care/Certification Expiration Date:  Plan of Care/Certification Expiration Date: 24    Frequency/Duration: Plan Frequency: 2 visits per week for 6 weeks (Possible reduction to 1 visit per week pending symptom response to therapy)      Time In/Out:   Time In:   Time Out: 162      PT Visit Info:    Plan Frequency: 2 visits per week for 6 weeks (Possible reduction to 1 visit per week pending symptom response to therapy)      Visit Count:  1    OUTPATIENT PHYSICAL THERAPY:   Treatment Note 2024       Episode  (Left leg pain)               Treatment Diagnosis:    Low back pain with left-sided sciatica, unspecified back pain laterality, unspecified chronicity  Left leg pain  Difficulty in walking, not elsewhere classified  Medical/Referring Diagnosis:    Left sided sciatica [M54.32]    Referring Physician:  Tracey Monge MD MD Orders:  PT Eval and Treat   Return MD Appt:  TBD   Date of Onset:  Onset Date:  (2023)     Allergies:   Nitrofurantoin  Restrictions/Precautions:   None      Interventions Planned (Treatment may consist of any combination of the following):     See Assessment Note    Subjective Comments:   Patient reports that she wants to reduce her pain such that she can go to [place] in May and walk without aggravating it.  Initial Pain Level::     4/10  Post Session Pain Level:        (Not rated, \"Feels a little looser\")/10  Medications Last Reviewed:  2024  Updated Objective Findings:  See Evaluation Note from today  Treatment     THERAPEUTIC EXERCISE: (14 minutes):

## 2024-01-26 ENCOUNTER — HOSPITAL ENCOUNTER (OUTPATIENT)
Dept: PHYSICAL THERAPY | Age: 59
Setting detail: RECURRING SERIES
Discharge: HOME | End: 2024-01-29
Payer: COMMERCIAL

## 2024-01-26 PROCEDURE — 97140 MANUAL THERAPY 1/> REGIONS: CPT

## 2024-01-26 NOTE — PROGRESS NOTES
focus on reducing L lower quarter discomfort.    >Total Treatment Billable Duration:  45 minutes + 5 minutes unbilled for dry needling  Time In: 1000  Time Out: 1050    HENRY SUGGS, PT         Charge Capture  [facility]  Appt Desk     Future Appointments   Date Time Provider Department Center   1/30/2024 10:00 AM Steven Millard, PTA [facility]   2/1/2024 12:30 PM Henry Suggs, PT [facility]   2/6/2024 10:00 AM Henry Suggs, PT [facility]   2/8/2024 12:30 PM Steven Millard, PTA [facility]   2/14/2024 11:00 AM Henry Suggs, PT [facility]   2/16/2024  2:30 PM Steven Millard, PTA [facility]   2/19/2024 10:00 AM Henry Suggs, PT [facility]   2/22/2024 10:00 AM Steven Millard, LD [facility]   2/23/2024 10:15 AM [facility] MOBILE MAMMO 1 [facility]   2/27/2024  1:30 PM Steven Millard, PTA [facility]   2/29/2024 12:30 PM Henry Suggs, PT [facility]   4/15/2024  8:30 AM Tracey Monge MD [facility]   1/6/2025  8:15 AM [facility] MISCELLANEOUS [facility]   1/13/2025  8:00 AM Tracey Monge MD [facility]

## 2024-01-30 ENCOUNTER — HOSPITAL ENCOUNTER (OUTPATIENT)
Dept: PHYSICAL THERAPY | Age: 59
Setting detail: RECURRING SERIES
Discharge: HOME | End: 2024-02-02
Payer: COMMERCIAL

## 2024-01-30 PROCEDURE — 97140 MANUAL THERAPY 1/> REGIONS: CPT

## 2024-01-30 PROCEDURE — 97110 THERAPEUTIC EXERCISES: CPT

## 2024-01-30 ASSESSMENT — PAIN SCALES - GENERAL: PAINLEVEL_OUTOF10: 2

## 2024-01-30 NOTE — PROGRESS NOTES
Natividad Oliveira  : 1965  Primary: Yudy Ramos (Commercial)  Secondary:  [facility] [place]  [address]  [facility] [address]  Phone: [phone]  Fax: [phone] Plan Frequency: 2 visits per week for 6 weeks (Possible reduction to 1 visit per week pending symptom response to therapy)    Plan of Care/Certification Expiration Date: 24        Plan of Care/Certification Expiration Date:  Plan of Care/Certification Expiration Date: 24    Frequency/Duration: Plan Frequency: 2 visits per week for 6 weeks (Possible reduction to 1 visit per week pending symptom response to therapy)      Time In/Out:   Time In: 1000  Time Out: 1102      PT Visit Info:    Plan Frequency: 2 visits per week for 6 weeks (Possible reduction to 1 visit per week pending symptom response to therapy)      Visit Count:  3    OUTPATIENT PHYSICAL THERAPY:   Treatment Note 2024       Episode  (Left leg pain)               Treatment Diagnosis:    Low back pain with left-sided sciatica, unspecified back pain laterality, unspecified chronicity  Left leg pain  Difficulty in walking, not elsewhere classified  Medical/Referring Diagnosis:    Left sided sciatica [M54.32]    Referring Physician:  Tracey Monge MD MD Orders:  PT Eval and Treat   Return MD Appt:  TBD   Date of Onset:  Onset Date:  (2023)     Allergies:   Nitrofurantoin  Restrictions/Precautions:   None      Interventions Planned (Treatment may consist of any combination of the following):     See Assessment Note    Subjective Comments: Patient reported she feels like the dry needling last session \"seemed to help\".    Initial Pain Level::     2/10  Post Session Pain Level:      /10  Medications Last Reviewed:  2024  Updated Objective Findings:   Palpation:  Moderate Left ITB soreness and trigger points.  Treatment   THERAPEUTIC EXERCISE: (23 minutes):    Exercises per grid below to improve mobility and

## 2024-02-01 ENCOUNTER — HOSPITAL ENCOUNTER (OUTPATIENT)
Dept: PHYSICAL THERAPY | Age: 59
Setting detail: RECURRING SERIES
Discharge: HOME | End: 2024-02-04
Payer: COMMERCIAL

## 2024-02-01 PROCEDURE — 97110 THERAPEUTIC EXERCISES: CPT

## 2024-02-01 PROCEDURE — 97140 MANUAL THERAPY 1/> REGIONS: CPT

## 2024-02-01 NOTE — PROGRESS NOTES
Natividad Oliveira  : 1965  Primary: Yudy Ramos (Commercial)  Secondary:  [facility] [place]  [address]  [facility] [address]  Phone: [phone]  Fax: [phone] Plan Frequency: 2 visits per week for 6 weeks (Possible reduction to 1 visit per week pending symptom response to therapy)    Plan of Care/Certification Expiration Date: 24        Plan of Care/Certification Expiration Date:  Plan of Care/Certification Expiration Date: 24    Frequency/Duration: Plan Frequency: 2 visits per week for 6 weeks (Possible reduction to 1 visit per week pending symptom response to therapy)      Time In/Out:   Time In: 1230  Time Out: 1323      PT Visit Info:    Plan Frequency: 2 visits per week for 6 weeks (Possible reduction to 1 visit per week pending symptom response to therapy)      Visit Count:  4    OUTPATIENT PHYSICAL THERAPY:   Treatment Note 2024       Episode  (Left leg pain)               Treatment Diagnosis:    Low back pain with left-sided sciatica, unspecified back pain laterality, unspecified chronicity  Left leg pain  Difficulty in walking, not elsewhere classified  Medical/Referring Diagnosis:    Left sided sciatica [M54.32]    Referring Physician:  Tracey Monge MD MD Orders:  PT Eval and Treat   Return MD Appt:  TBD   Date of Onset:  Onset Date:  (2023)     Allergies:   Nitrofurantoin  Restrictions/Precautions:   None      Interventions Planned (Treatment may consist of any combination of the following):     See Assessment Note    Subjective Comments: Patient reports that she was pretty sore after her last appointment, but it feels pretty good today.     Initial Pain Level::  Not rated; \"Very little pain\"    /10  Post Session Pain Level:   Not rated - \"sore\"    /10  Medications Last Reviewed:  2024  Updated Objective Findings:   Palpation: tender to L gluteals    Treatment   THERAPEUTIC EXERCISE: (30 minutes):    Exercises

## 2024-02-06 ENCOUNTER — HOSPITAL ENCOUNTER (OUTPATIENT)
Dept: PHYSICAL THERAPY | Age: 59
Setting detail: RECURRING SERIES
Discharge: HOME | End: 2024-02-09
Payer: COMMERCIAL

## 2024-02-06 PROCEDURE — 97140 MANUAL THERAPY 1/> REGIONS: CPT

## 2024-02-06 PROCEDURE — 97110 THERAPEUTIC EXERCISES: CPT

## 2024-02-06 NOTE — PROGRESS NOTES
Natividad Oliveira  : 1965  Primary: Yudy Ramos (Commercial)  Secondary:  [facility] [place]  [address]  [facility] [address]  Phone: [phone]  Fax: [phone] Plan Frequency: 2 visits per week for 6 weeks (Possible reduction to 1 visit per week pending symptom response to therapy)    Plan of Care/Certification Expiration Date: 24        Plan of Care/Certification Expiration Date:  Plan of Care/Certification Expiration Date: 24    Frequency/Duration: Plan Frequency: 2 visits per week for 6 weeks (Possible reduction to 1 visit per week pending symptom response to therapy)      Time In/Out:   Time In: 1000  Time Out: 1055      PT Visit Info:    Plan Frequency: 2 visits per week for 6 weeks (Possible reduction to 1 visit per week pending symptom response to therapy)      Visit Count:  5    OUTPATIENT PHYSICAL THERAPY:   Treatment Note 2024       Episode  (Left leg pain)               Treatment Diagnosis:    Low back pain with left-sided sciatica, unspecified back pain laterality, unspecified chronicity  Left leg pain  Difficulty in walking, not elsewhere classified  Medical/Referring Diagnosis:    Left sided sciatica [M54.32]    Referring Physician:  Tracey Monge MD MD Orders:  PT Eval and Treat   Return MD Appt:  TBD   Date of Onset:  Onset Date:  (2023)     Allergies:   Nitrofurantoin  Restrictions/Precautions:   None      Interventions Planned (Treatment may consist of any combination of the following):     See Assessment Note    Subjective Comments: Patient reports that her hip/glute feels good today but was painful over the weekend.    Initial Pain Level::  Feels ok today    /10  Post Session Pain Level:   Not rated - \"sore\"    /10  Medications Last Reviewed:  2024  Updated Objective Findings:   Palpation: tender to L gluteals    Treatment   THERAPEUTIC EXERCISE: (30 minutes):    Exercises per grid below to improve

## 2024-02-08 ENCOUNTER — HOSPITAL ENCOUNTER (OUTPATIENT)
Dept: PHYSICAL THERAPY | Age: 59
Setting detail: RECURRING SERIES
Discharge: HOME | End: 2024-02-11
Payer: COMMERCIAL

## 2024-02-08 PROCEDURE — 97110 THERAPEUTIC EXERCISES: CPT

## 2024-02-08 PROCEDURE — 97140 MANUAL THERAPY 1/> REGIONS: CPT

## 2024-02-08 NOTE — PROGRESS NOTES
Natividad Oliveira  : 1965  Primary: Yudy Ramos (Commercial)  Secondary:  [facility] [place]  Zina IRVIN A  [facility] [address]  Phone: [phone]  Fax: [phone] Plan Frequency: 2 visits per week for 6 weeks (Possible reduction to 1 visit per week pending symptom response to therapy)    Plan of Care/Certification Expiration Date: 24        Plan of Care/Certification Expiration Date:  Plan of Care/Certification Expiration Date: 24    Frequency/Duration: Plan Frequency: 2 visits per week for 6 weeks (Possible reduction to 1 visit per week pending symptom response to therapy)      Time In/Out:   Time In: 1228  Time Out: 1322      PT Visit Info:    Plan Frequency: 2 visits per week for 6 weeks (Possible reduction to 1 visit per week pending symptom response to therapy)      Visit Count:  6    OUTPATIENT PHYSICAL THERAPY:   Treatment Note 2024       Episode  (Left leg pain)               Treatment Diagnosis:    Low back pain with left-sided sciatica, unspecified back pain laterality, unspecified chronicity  Left leg pain  Difficulty in walking, not elsewhere classified  Medical/Referring Diagnosis:    Left sided sciatica [M54.32]    Referring Physician:  Tracey Monge MD MD Orders:  PT Eval and Treat   Return MD Appt:  TBD   Date of Onset:  Onset Date:  (2023)     Allergies:   Nitrofurantoin  Restrictions/Precautions:   None      Interventions Planned (Treatment may consist of any combination of the following):     See Assessment Note    Subjective Comments: Patient reported she felt \"pretty good\" prior to today's therapy session. Patient also stated she feels like the needling has been helping.    Initial Pain Level::     10  Post Session Pain Level:      /10  Medications Last Reviewed:  2024  Updated Objective Findings:   Palpation: mild tenderness to L ITB.    Treatment   THERAPEUTIC EXERCISE: (38 minutes):    Exercises per

## 2024-02-14 ENCOUNTER — HOSPITAL ENCOUNTER (OUTPATIENT)
Dept: PHYSICAL THERAPY | Age: 59
Setting detail: RECURRING SERIES
Discharge: HOME | End: 2024-02-17
Payer: COMMERCIAL

## 2024-02-14 PROCEDURE — 97110 THERAPEUTIC EXERCISES: CPT

## 2024-02-14 PROCEDURE — 97140 MANUAL THERAPY 1/> REGIONS: CPT

## 2024-02-14 NOTE — PROGRESS NOTES
Natividad Oliveira  : 1965  Primary: Yudy Ramos (Commercial)  Secondary:  [facility] [place]  Zina DOSHI  [facility] [address]  Phone: [phone]  Fax: [phone] Plan Frequency: 2 visits per week for 6 weeks (Possible reduction to 1 visit per week pending symptom response to therapy)    Plan of Care/Certification Expiration Date: 24        Plan of Care/Certification Expiration Date:  Plan of Care/Certification Expiration Date: 24    Frequency/Duration: Plan Frequency: 2 visits per week for 6 weeks (Possible reduction to 1 visit per week pending symptom response to therapy)      Time In/Out:   Time In: 1430  Time Out: 1515      PT Visit Info:    Plan Frequency: 2 visits per week for 6 weeks (Possible reduction to 1 visit per week pending symptom response to therapy)      Visit Count:  7    OUTPATIENT PHYSICAL THERAPY:   Treatment Note 2024       Episode  (Left leg pain)               Treatment Diagnosis:    Low back pain with left-sided sciatica, unspecified back pain laterality, unspecified chronicity  Left leg pain  Difficulty in walking, not elsewhere classified  Medical/Referring Diagnosis:    Left sided sciatica [M54.32]    Referring Physician:  Tracey Monge MD MD Orders:  PT Eval and Treat   Return MD Appt:  TBD   Date of Onset:  Onset Date:  (2023)     Allergies:   Nitrofurantoin  Restrictions/Precautions:   None      Interventions Planned (Treatment may consist of any combination of the following):     See Assessment Note    Subjective Comments: Reports no pain to L gluteal/hip today upon arrival to PT. Has been feeling pretty good lately. Feels that therapy has been helpful.    Initial Pain Level::     0/10  Post Session Pain Level:      0/10  Medications Last Reviewed:  2024  Updated Objective Findings:   Good twitch response with dry needling to L piriformis and proximal hamstring.    Treatment   THERAPEUTIC

## 2024-02-16 ENCOUNTER — HOSPITAL ENCOUNTER (OUTPATIENT)
Dept: PHYSICAL THERAPY | Age: 59
Setting detail: RECURRING SERIES
Discharge: HOME | End: 2024-02-19
Payer: COMMERCIAL

## 2024-02-16 PROCEDURE — 97110 THERAPEUTIC EXERCISES: CPT

## 2024-02-16 PROCEDURE — 97140 MANUAL THERAPY 1/> REGIONS: CPT

## 2024-02-16 ASSESSMENT — PAIN SCALES - GENERAL: PAINLEVEL_OUTOF10: 0

## 2024-02-16 NOTE — PROGRESS NOTES
Natividad Oliveira  : 1965  Primary: Yudy Ramos (Commercial)  Secondary:  [facility] [place]  Zina DOSHI  [facility] [address]  Phone: [phone]  Fax: [phone] Plan Frequency: 2 visits per week for 6 weeks (Possible reduction to 1 visit per week pending symptom response to therapy)    Plan of Care/Certification Expiration Date: 24        Plan of Care/Certification Expiration Date:  Plan of Care/Certification Expiration Date: 24    Frequency/Duration: Plan Frequency: 2 visits per week for 6 weeks (Possible reduction to 1 visit per week pending symptom response to therapy)      Time In/Out:   Time In: 1441  Time Out: 1527      PT Visit Info:    Plan Frequency: 2 visits per week for 6 weeks (Possible reduction to 1 visit per week pending symptom response to therapy)      Visit Count:  8    OUTPATIENT PHYSICAL THERAPY:   Treatment Note 2024       Episode  (Left leg pain)               Treatment Diagnosis:    Low back pain with left-sided sciatica, unspecified back pain laterality, unspecified chronicity  Left leg pain  Difficulty in walking, not elsewhere classified  Medical/Referring Diagnosis:    Left sided sciatica [M54.32]    Referring Physician:  Tracey Monge MD MD Orders:  PT Eval and Treat   Return MD Appt:  TBD   Date of Onset:  Onset Date:  (2023)     Allergies:   Nitrofurantoin  Restrictions/Precautions:   None      Interventions Planned (Treatment may consist of any combination of the following):     See Assessment Note    Subjective Comments: Patient reported no pain today prior to therapy. Patient also stated she feels like therapy is really helping.     Initial Pain Level::     0/10  Post Session Pain Level:      0/10  Medications Last Reviewed:  2024  Updated Objective Findings:   Palpation: Moderate tenderness and soreness to L ITB.    Treatment   THERAPEUTIC EXERCISE: (30 minutes):    Exercises per grid

## 2024-02-19 ENCOUNTER — HOSPITAL ENCOUNTER (OUTPATIENT)
Dept: PHYSICAL THERAPY | Age: 59
Setting detail: RECURRING SERIES
Discharge: HOME | End: 2024-02-22
Payer: COMMERCIAL

## 2024-02-19 PROCEDURE — 97140 MANUAL THERAPY 1/> REGIONS: CPT

## 2024-02-19 PROCEDURE — 97110 THERAPEUTIC EXERCISES: CPT

## 2024-02-19 ASSESSMENT — PAIN SCALES - GENERAL: PAINLEVEL_OUTOF10: 0

## 2024-02-19 NOTE — PROGRESS NOTES
Natividad Oliveira  : 1965  Primary: Yudy Ramos (Commercial)  Secondary:  [facility] [place]  Zina IRVIN A  [facility] [address]  Phone: [phone]  Fax: [phone] Plan Frequency: 2 visits per week for 6 weeks (Possible reduction to 1 visit per week pending symptom response to therapy)    Plan of Care/Certification Expiration Date: 24        Plan of Care/Certification Expiration Date:  Plan of Care/Certification Expiration Date: 24    Frequency/Duration: Plan Frequency: 2 visits per week for 6 weeks (Possible reduction to 1 visit per week pending symptom response to therapy)      Time In/Out:          PT Visit Info:    Plan Frequency: 2 visits per week for 6 weeks (Possible reduction to 1 visit per week pending symptom response to therapy)      Visit Count:  9                OUTPATIENT PHYSICAL THERAPY:             Progress Report 2024               Episode (Left leg pain)         Treatment Diagnosis:     No data found  Medical/Referring Diagnosis:    Left sided sciatica [M54.32]    Referring Physician:  Tracey Monge MD MD Orders:  PT Eval and Treat   Return MD Appt:  TBD  Date of Onset:  Onset Date:  (2023)     Allergies:  Nitrofurantoin  Restrictions/Precautions:    None      Medications Last Reviewed:  2024         OBJECTIVE     Patient denies any LE paresthesia. Patient denies any increase of symptoms with cough, sneeze or valsalva. Patient denies any saddle paresthesia or bowel/bladder deficits.   Patient denies  any headaches, changes in vision, dizziness, vertigo, nausea, drop attacks, black outs, tinnitus, dysphagia, dysarthria, LE symptoms or bowel/bladder dysfunction.    Observation/Orthostatic Postural Assessment:          Bilateral iliac crest and ASIS level and symmetrical in standing and supine.     Palpation:          Tight and tender to L piriformis, and L glute medius. Tight to bilateral lumbar

## 2024-02-19 NOTE — PROGRESS NOTES
Natividad Oliveira  : 1965  Primary: Yudy Ramos (Commercial)  Secondary:  [facility] [place]  Zina DOSHI  [facility] [address]  Phone: [phone]  Fax: [phone] Plan Frequency: 2 visits per week for 6 weeks (Possible reduction to 1 visit per week pending symptom response to therapy)    Plan of Care/Certification Expiration Date: 24        Plan of Care/Certification Expiration Date:  Plan of Care/Certification Expiration Date: 24    Frequency/Duration: Plan Frequency: 2 visits per week for 6 weeks (Possible reduction to 1 visit per week pending symptom response to therapy)      Time In/Out:   Time In: 1000  Time Out: 1053      PT Visit Info:    Plan Frequency: 2 visits per week for 6 weeks (Possible reduction to 1 visit per week pending symptom response to therapy)      Visit Count:  9    OUTPATIENT PHYSICAL THERAPY:   Treatment Note 2024       Episode  (Left leg pain)               Treatment Diagnosis:    Low back pain with left-sided sciatica, unspecified back pain laterality, unspecified chronicity  Left leg pain  Difficulty in walking, not elsewhere classified  Medical/Referring Diagnosis:    Left sided sciatica [M54.32]    Referring Physician:  Tracey Monge MD MD Orders:  PT Eval and Treat   Return MD Appt:  TBD   Date of Onset:  Onset Date:  (2023)     Allergies:   Nitrofurantoin  Restrictions/Precautions:   None      Interventions Planned (Treatment may consist of any combination of the following):     See Assessment Note    Subjective Comments: Patient reports that she has no pain today upon arrival to PT. Feels like it is 80% better.    Initial Pain Level::     0/10  Post Session Pain Level:      0/10  Medications Last Reviewed:  2024  Updated Objective Findings:   Mildly tender to L piriformis and L proximal hamstring.    Treatment   THERAPEUTIC EXERCISE: (30 minutes):    Exercises per grid below to improve

## 2024-02-22 ENCOUNTER — HOSPITAL ENCOUNTER (OUTPATIENT)
Dept: PHYSICAL THERAPY | Age: 59
Setting detail: RECURRING SERIES
Discharge: HOME | End: 2024-02-25
Payer: COMMERCIAL

## 2024-02-22 PROCEDURE — 97140 MANUAL THERAPY 1/> REGIONS: CPT

## 2024-02-22 PROCEDURE — 97110 THERAPEUTIC EXERCISES: CPT

## 2024-02-22 ASSESSMENT — PAIN SCALES - GENERAL: PAINLEVEL_OUTOF10: 0

## 2024-02-22 NOTE — PROGRESS NOTES
Natividad Oliveira  : 1965  Primary: Yudy Ramos (Commercial)  Secondary:  [facility] [place]  Zina DOSHI  [facility] [address]  Phone: [phone]  Fax: [phone] Plan Frequency: 2 visits per week for 6 weeks (Possible reduction to 1 visit per week pending symptom response to therapy)    Plan of Care/Certification Expiration Date: 24        Plan of Care/Certification Expiration Date:  Plan of Care/Certification Expiration Date: 24    Frequency/Duration: Plan Frequency: 2 visits per week for 6 weeks (Possible reduction to 1 visit per week pending symptom response to therapy)      Time In/Out:   Time In: 958  Time Out:       PT Visit Info:    Plan Frequency: 2 visits per week for 6 weeks (Possible reduction to 1 visit per week pending symptom response to therapy)      Visit Count:  10    OUTPATIENT PHYSICAL THERAPY:   Treatment Note 2024       Episode  (Left leg pain)               Treatment Diagnosis:    Low back pain with left-sided sciatica, unspecified back pain laterality, unspecified chronicity  Left leg pain  Difficulty in walking, not elsewhere classified  Medical/Referring Diagnosis:    Left sided sciatica [M54.32]    Referring Physician:  Tracey Monge MD MD Orders:  PT Eval and Treat   Return MD Appt:  TBD   Date of Onset:  Onset Date:  (2023)     Allergies:   Nitrofurantoin  Restrictions/Precautions:   None      Interventions Planned (Treatment may consist of any combination of the following):     See Assessment Note    Subjective Comments: Patient reported she was feeling \"good\" this morning prior to therapy.     Initial Pain Level::     0/10  Post Session Pain Level:      0/10  Medications Last Reviewed:  2024  Updated Objective Findings:   Palpation: minimal soreness and tenderness to the L piriformis and proximal hamstring.    Treatment   THERAPEUTIC EXERCISE: (30 minutes):    Exercises per grid below to

## 2024-02-23 ENCOUNTER — HOSPITAL ENCOUNTER (OUTPATIENT)
Dept: MAMMOGRAPHY | Age: 59
Discharge: HOME | End: 2024-02-23
Attending: FAMILY MEDICINE
Payer: COMMERCIAL

## 2024-02-23 VITALS — HEIGHT: 62 IN | WEIGHT: 186 LBS | BODY MASS INDEX: 34.23 KG/M2

## 2024-02-23 DIAGNOSIS — Z12.31 ENCOUNTER FOR SCREENING MAMMOGRAM FOR MALIGNANT NEOPLASM OF BREAST: ICD-10-CM

## 2024-02-23 PROCEDURE — 77063 BREAST TOMOSYNTHESIS BI: CPT

## 2024-02-23 PROCEDURE — 77067 SCR MAMMO BI INCL CAD: CPT

## 2024-02-27 ENCOUNTER — HOSPITAL ENCOUNTER (OUTPATIENT)
Dept: PHYSICAL THERAPY | Age: 59
Setting detail: RECURRING SERIES
Discharge: HOME | End: 2024-03-01
Payer: COMMERCIAL

## 2024-02-27 PROCEDURE — 97110 THERAPEUTIC EXERCISES: CPT

## 2024-02-27 PROCEDURE — 97140 MANUAL THERAPY 1/> REGIONS: CPT

## 2024-02-27 ASSESSMENT — PAIN SCALES - GENERAL: PAINLEVEL_OUTOF10: 0

## 2024-02-27 NOTE — PROGRESS NOTES
Natividad Oliveira  : 1965  Primary: Yudy Ortega (Commercial)  Secondary:  [facility] [place]  Zina DOSHI  [facility] [address]  Phone: [phone]  Fax: [phone] Plan Frequency: 2 visits per week for 6 weeks (Possible reduction to 1 visit per week pending symptom response to therapy)    Plan of Care/Certification Expiration Date: 24        Plan of Care/Certification Expiration Date:  Plan of Care/Certification Expiration Date: 24    Frequency/Duration: Plan Frequency: 2 visits per week for 6 weeks (Possible reduction to 1 visit per week pending symptom response to therapy)      Time In/Out:   Time In: 0  Time Out: 1424      PT Visit Info:    Plan Frequency: 2 visits per week for 6 weeks (Possible reduction to 1 visit per week pending symptom response to therapy)      Visit Count:  11    OUTPATIENT PHYSICAL THERAPY:   Treatment Note 2024       Episode  (Left leg pain)               Treatment Diagnosis:    Low back pain with left-sided sciatica, unspecified back pain laterality, unspecified chronicity  Left leg pain  Difficulty in walking, not elsewhere classified  Medical/Referring Diagnosis:    Left sided sciatica [M54.32]    Referring Physician:  Tracey Monge MD MD Orders:  PT Eval and Treat   Return MD Appt:  TBD   Date of Onset:  Onset Date:  (2023)     Allergies:   Nitrofurantoin  Restrictions/Precautions:   None      Interventions Planned (Treatment may consist of any combination of the following):     See Assessment Note    Subjective Comments: Patient reported no increased soreness from her previous therapy session.    Initial Pain Level::     0/10  Post Session Pain Level:      0/10  Medications Last Reviewed:  2024  Updated Objective Findings:   Palpation: minimal to no soreness and tenderness to the L piriformis and proximal hamstring.    Treatment   THERAPEUTIC EXERCISE: (30 minutes):    Exercises per grid

## 2024-02-29 ENCOUNTER — HOSPITAL ENCOUNTER (OUTPATIENT)
Dept: PHYSICAL THERAPY | Age: 59
Setting detail: RECURRING SERIES
End: 2024-02-29
Payer: COMMERCIAL

## 2024-02-29 PROCEDURE — 97140 MANUAL THERAPY 1/> REGIONS: CPT

## 2024-02-29 PROCEDURE — 97110 THERAPEUTIC EXERCISES: CPT

## 2024-02-29 NOTE — THERAPY RECERTIFICATION
AROM (degrees)   Lumbar Flexion Fingertips to floor   Lumbar Extension 15 degrees   Lumbar Right Sidebend 20   Lumbar Left Sidebend 20     AROM/ PROM Left (degrees) Right (degrees)   Hip Flexion 110 110   Hip Extension 10 10   Hip Abduction 40 40   Hip Adduction NT NT   Hip Internal Rotation   (90 degrees flexion) 45 45   Hip External Rotation   (90 degrees flexion) 45 45     Strength:            Motion Tested Left   (*/5) Right  (*/5)   Hip Flexion 5 5   Hip Extension 5 ( from 4+) 5   Hip Abduction 4 5   Knee Flexion 5 5   Knee Extension 5 5   Ankle Dorsiflexion 5 5   Ankle Plantarflexion 5 5   Gross core strength is grossly within functional limits.     Special Tests:          Neural tension tests: Passive straight leg raise (SLR) test is negative. Crossed SLR test is negative. Slump test is negative. Femoral nerve stretch test is negative.  Negative MATHEW bilaterally. Negative FADIR on L. Positive Tavia on L, negative on R.   Passive Accessory Motion:         Generally limited with central posterior-anterior mobilizations at L4-L5, L5-S1.    Neurological Screen:              Myotomes: Key muscle strength testing through bilateral LE is WFL.   Dermatomes: Sensation to light touch for bilateral LE is intact from L2 to S1.   Reflexes: Patellar (L3/ L4): Very brisk, bilateral                 Achilles (S1/ S2): Very brisk, bilateral          Biceps (C5): NT         Brachioradialis (C6): NT        Triceps (C7): NT  Abnormal reflexes: Clonus: negative, Tomas: NT, Babinski: NT  Neural tension tests: Upper limb tension test is NT. Slump test is negative.    Functional Mobility:         Gait/Ambulation:  Independent        Transfers:  Independent        Bed Mobility:  Independent    Balance:          Not tested       ASSESSMENT   Recertification Summary: Patient has attended 12 PT appointments to address L LE and buttock pain. She has made excellent progress and her pain is subjectively much improved since  Frame: 6 weeks  Patient will report 50% improvement in L LE symptoms. MET 2/19/23  Patient will improve score on Oswestry Low Back Pain Questionnaire to <15% limited. MET 2/19/24  Patient will be independent with HEP.  Patient will be able to sleep without interruption from L LE pain.       Outcome Measure:   Tool Used: Modified Oswestry Low Back Pain Questionnaire  Score:  Initial: 14/50  Most Recent: 0/50 (Date: 2/19/24 )   Interpretation of Score: Each section is scored on a 0-5 scale, 5 representing the greatest disability.  The scores of each section are added together for a total score of 50.      Medical Necessity:   > Skilled intervention continues to be required due to L LE discomfort and reduced activity participation.  Reason For Services/Other Comments:  > Patient continues to require skilled intervention due to L LE pain and discomfort.      Regarding Natividad Oliveira's therapy, I certify that the treatment plan above will be carried out by a therapist or under their direction.  Thank you for this referral,  MARY SUGGS PT     Referring Physician Signature: Tracey Monge MD _______________________________ Date _____________        Charge Capture  Appt Desk

## 2024-02-29 NOTE — PROGRESS NOTES
Natividad Oliveira  : 1965  Primary: Yudy Ramos (Commercial)  Secondary:  [facility] [place]  [address]ina DOSHI  [facility] [address]  Phone: [phone]  Fax: [phone] Plan Frequency: 1 visit per week for 5 weeks, possibly 2 depending on symptom response    Plan of Care/Certification Expiration Date: 24        Plan of Care/Certification Expiration Date:  Plan of Care/Certification Expiration Date: 24    Frequency/Duration: Plan Frequency: 1 visit per week for 5 weeks, possibly 2 depending on symptom response      Time In/Out:   Time In: 1230  Time Out: 1325      PT Visit Info:    Plan Frequency: 1 visit per week for 5 weeks, possibly 2 depending on symptom response      Visit Count:  12    OUTPATIENT PHYSICAL THERAPY:   Treatment Note 2024       Episode  (Left leg pain)               Treatment Diagnosis:    Low back pain with left-sided sciatica, unspecified back pain laterality, unspecified chronicity  Left leg pain  Difficulty in walking, not elsewhere classified  Medical/Referring Diagnosis:    Left sided sciatica [M54.32]    Referring Physician:  Tracey Monge MD MD Orders:  PT Eval and Treat   Return MD Appt:  TBD   Date of Onset:  Onset Date:  (2023)     Allergies:   Nitrofurantoin  Restrictions/Precautions:   None      Interventions Planned (Treatment may consist of any combination of the following):     See Assessment Note    Subjective Comments: Patient reports that her L buttock pain is doing much better. Is in agreement to reduce frequency to one visit per week and see how her symptoms respond.    Initial Pain Level::     0/10  Post Session Pain Level:      0/10  Medications Last Reviewed:  2024  Updated Objective Findings:   See Recertification.    Treatment   THERAPEUTIC EXERCISE: (30 minutes):    Exercises per grid below to improve mobility and strength.  Required minimal verbal and manual cues to promote proper

## 2024-03-01 ASSESSMENT — PAIN SCALES - GENERAL: PAINLEVEL_OUTOF10: 0

## 2024-03-01 NOTE — THERAPY RECERTIFICATION
Natividad Oliveira  : 1965  Primary: Yudy Ramos (Commercial)  Secondary:  [facility] [place]  Zina DOSHI  [facility] [address]  Phone: [phone]  Fax: [phone] Plan Frequency: 1 visit per week for 5 weeks, possibly 2 depending on symptom response    Plan of Care/Certification Expiration Date: 24        Plan of Care/Certification Expiration Date:  Plan of Care/Certification Expiration Date: 24    Frequency/Duration: Plan Frequency: 1 visit per week for 5 weeks, possibly 2 depending on symptom response      Time In/Out:   Time In: 1230  Time Out: 1325      PT Visit Info:    Plan Frequency: 1 visit per week for 5 weeks, possibly 2 depending on symptom response      Visit Count:  12                OUTPATIENT PHYSICAL THERAPY:             Recertification 2024               Episode (Left leg pain)         Treatment Diagnosis:     Low back pain with left-sided sciatica, unspecified back pain laterality, unspecified chronicity  Left leg pain  Difficulty in walking, not elsewhere classified  Medical/Referring Diagnosis:    Left sided sciatica [M54.32]    Referring Physician:  Tracey Monge MD MD Orders:  PT Eval and Treat   Return MD Appt:  TBD  Date of Onset:  Onset Date:  (2023)     Allergies:  Nitrofurantoin  Restrictions/Precautions:    None      Medications Last Reviewed:  2024         OBJECTIVE     Patient denies any LE paresthesia. Patient denies any increase of symptoms with cough, sneeze or valsalva. Patient denies any saddle paresthesia or bowel/bladder deficits.   Patient denies  any headaches, changes in vision, dizziness, vertigo, nausea, drop attacks, black outs, tinnitus, dysphagia, dysarthria, LE symptoms or bowel/bladder dysfunction.    Observation/Orthostatic Postural Assessment:          Bilateral iliac crest and ASIS level and symmetrical in standing and supine.     Palpation:          Tight and tender to  L piriformis, and L glute medius. Tight to bilateral lumbar paraspinals. Palpable tightness to L IT band. Tender to L proximal hamstring.     ROM:            AROM (degrees)   Lumbar Flexion Fingertips to floor   Lumbar Extension 15 degrees   Lumbar Right Sidebend 20   Lumbar Left Sidebend 20     AROM/ PROM Left (degrees) Right (degrees)   Hip Flexion 110 110   Hip Extension 10 10   Hip Abduction 40 40   Hip Adduction NT NT   Hip Internal Rotation   (90 degrees flexion) 45 45   Hip External Rotation   (90 degrees flexion) 45 45     Strength:            Motion Tested Left   (*/5) Right  (*/5)   Hip Flexion 5 5   Hip Extension 5 ( from 4+) 5   Hip Abduction 4 5   Knee Flexion 5 5   Knee Extension 5 5   Ankle Dorsiflexion 5 5   Ankle Plantarflexion 5 5   Gross core strength is grossly within functional limits.     Special Tests:          Neural tension tests: Passive straight leg raise (SLR) test is negative. Crossed SLR test is negative. Slump test is negative. Femoral nerve stretch test is negative.  Negative MATHEW bilaterally. Negative FADIR on L. Positive Tavia on L, negative on R.   Passive Accessory Motion:         Generally limited with central posterior-anterior mobilizations at L4-L5, L5-S1.    Neurological Screen:              Myotomes: Key muscle strength testing through bilateral LE is WFL.   Dermatomes: Sensation to light touch for bilateral LE is intact from L2 to S1.   Reflexes: Patellar (L3/ L4): Very brisk, bilateral                 Achilles (S1/ S2): Very brisk, bilateral          Biceps (C5): NT         Brachioradialis (C6): NT        Triceps (C7): NT  Abnormal reflexes: Clonus: negative, Tomas: NT, Babinski: NT  Neural tension tests: Upper limb tension test is NT. Slump test is negative.    Functional Mobility:         Gait/Ambulation:  Independent        Transfers:  Independent        Bed Mobility:  Independent    Balance:          Not tested       ASSESSMENT   Recertification Summary: Patient has

## 2024-03-08 ENCOUNTER — HOSPITAL ENCOUNTER (OUTPATIENT)
Dept: PHYSICAL THERAPY | Age: 59
Setting detail: RECURRING SERIES
Discharge: HOME | End: 2024-03-11
Payer: COMMERCIAL

## 2024-03-08 PROCEDURE — 97110 THERAPEUTIC EXERCISES: CPT

## 2024-03-08 ASSESSMENT — PAIN SCALES - GENERAL: PAINLEVEL_OUTOF10: 0

## 2024-03-08 NOTE — PROGRESS NOTES
leads to alternation in length and tension of muscle fibers    The patient tolerated the treatment with a positive treatment effect and no complications noted. Clinical outcome of the treatment included needling treatment outcome: decrease in pain levels.    Dry Needles Used: 4   Dry Needles Removed: 4     Patient has been educated with post-treatment care including hydration and mobility.       MODALITIES: (0 minutes):        None today      HEP: As above; handouts given to patient for all exercises.    Treatment/Session Summary:    Treatment Assessment: Patient showed good tolerance to their therapy session today with no increased soreness; the reverse clamshell was added into today's therapy session to help with strength progression.    Communication/Consultation:   Updated HEP  Equipment provided today:  HEP and Theraband  Recommendations/Intent for next treatment session: Possible patient self discharge next week.    >Total Treatment Billable Duration:  45 minutes  Time In: 1430  Time Out: 1515    STEVEN MILLARD PTA         Charge Capture  [facility] Portal  Appt Desk     Future Appointments   Date Time Provider Department Center   3/15/2024  2:30 PM Steven Millard PTA [facility]   3/18/2024 11:00 AM Henry Frost, PT [facility]   3/29/2024 11:00 AM Henry Frost, PT [facility]   4/3/2024  2:30 PM Henry Frost, PT [facility]   4/15/2024  8:30 AM Tracey Monge MD [facility]   1/6/2025  8:15 AM MANOJ MISCELLANEOUS [facility]   1/13/2025  8:00 AM Tracey Monge MD [facility]

## 2024-03-15 ENCOUNTER — HOSPITAL ENCOUNTER (OUTPATIENT)
Dept: PHYSICAL THERAPY | Age: 59
Setting detail: RECURRING SERIES
End: 2024-03-15
Payer: COMMERCIAL

## 2024-03-15 NOTE — PROGRESS NOTES
Physical Therapy  [facility]  Date: 3/15/2024          Patient called and had to cancel today's therapy appointment and the rest of her appointments due to self discharging.         Steven Millard, PTA

## 2024-03-18 ENCOUNTER — APPOINTMENT (OUTPATIENT)
Dept: PHYSICAL THERAPY | Age: 59
End: 2024-03-18
Payer: COMMERCIAL

## 2024-03-29 ENCOUNTER — APPOINTMENT (OUTPATIENT)
Dept: PHYSICAL THERAPY | Age: 59
End: 2024-03-29
Payer: COMMERCIAL

## 2024-04-15 ENCOUNTER — OFFICE VISIT (OUTPATIENT)
Dept: FAMILY MEDICINE CLINIC | Facility: CLINIC | Age: 59
End: 2024-04-15
Payer: COMMERCIAL

## 2024-04-15 VITALS
HEART RATE: 86 BPM | SYSTOLIC BLOOD PRESSURE: 134 MMHG | TEMPERATURE: 97.2 F | WEIGHT: 186 LBS | DIASTOLIC BLOOD PRESSURE: 83 MMHG | OXYGEN SATURATION: 93 % | RESPIRATION RATE: 16 BRPM | BODY MASS INDEX: 34.23 KG/M2 | HEIGHT: 62 IN

## 2024-04-15 DIAGNOSIS — K58.0 IRRITABLE BOWEL SYNDROME WITH DIARRHEA: ICD-10-CM

## 2024-04-15 DIAGNOSIS — F41.9 ANXIETY: ICD-10-CM

## 2024-04-15 DIAGNOSIS — F32.0 CURRENT MILD EPISODE OF MAJOR DEPRESSIVE DISORDER WITHOUT PRIOR EPISODE (HCC): Primary | ICD-10-CM

## 2024-04-15 PROCEDURE — 99214 OFFICE O/P EST MOD 30 MIN: CPT | Performed by: FAMILY MEDICINE

## 2024-04-15 RX ORDER — SERTRALINE HYDROCHLORIDE 100 MG/1
TABLET, FILM COATED ORAL
Qty: 135 TABLET | Refills: 3 | Status: SHIPPED | OUTPATIENT
Start: 2024-04-15

## 2024-04-15 RX ORDER — DICYCLOMINE HYDROCHLORIDE 10 MG/1
10 CAPSULE ORAL
Qty: 120 CAPSULE | Refills: 11 | Status: SHIPPED | OUTPATIENT
Start: 2024-04-15

## 2024-04-15 ASSESSMENT — PATIENT HEALTH QUESTIONNAIRE - PHQ9
7. TROUBLE CONCENTRATING ON THINGS, SUCH AS READING THE NEWSPAPER OR WATCHING TELEVISION: NOT AT ALL
5. POOR APPETITE OR OVEREATING: NOT AT ALL
2. FEELING DOWN, DEPRESSED OR HOPELESS: NOT AT ALL
8. MOVING OR SPEAKING SO SLOWLY THAT OTHER PEOPLE COULD HAVE NOTICED. OR THE OPPOSITE, BEING SO FIGETY OR RESTLESS THAT YOU HAVE BEEN MOVING AROUND A LOT MORE THAN USUAL: NOT AT ALL
1. LITTLE INTEREST OR PLEASURE IN DOING THINGS: NOT AT ALL
SUM OF ALL RESPONSES TO PHQ QUESTIONS 1-9: 1
SUM OF ALL RESPONSES TO PHQ QUESTIONS 1-9: 1
9. THOUGHTS THAT YOU WOULD BE BETTER OFF DEAD, OR OF HURTING YOURSELF: NOT AT ALL
SUM OF ALL RESPONSES TO PHQ QUESTIONS 1-9: 1
3. TROUBLE FALLING OR STAYING ASLEEP: SEVERAL DAYS
SUM OF ALL RESPONSES TO PHQ QUESTIONS 1-9: 1
4. FEELING TIRED OR HAVING LITTLE ENERGY: NOT AT ALL
SUM OF ALL RESPONSES TO PHQ9 QUESTIONS 1 & 2: 0
6. FEELING BAD ABOUT YOURSELF - OR THAT YOU ARE A FAILURE OR HAVE LET YOURSELF OR YOUR FAMILY DOWN: NOT AT ALL
10. IF YOU CHECKED OFF ANY PROBLEMS, HOW DIFFICULT HAVE THESE PROBLEMS MADE IT FOR YOU TO DO YOUR WORK, TAKE CARE OF THINGS AT HOME, OR GET ALONG WITH OTHER PEOPLE: NOT DIFFICULT AT ALL

## 2024-04-15 ASSESSMENT — ANXIETY QUESTIONNAIRES
GAD7 TOTAL SCORE: 8
6. BECOMING EASILY ANNOYED OR IRRITABLE: SEVERAL DAYS
5. BEING SO RESTLESS THAT IT IS HARD TO SIT STILL: NOT AT ALL
2. NOT BEING ABLE TO STOP OR CONTROL WORRYING: SEVERAL DAYS
4. TROUBLE RELAXING: SEVERAL DAYS
3. WORRYING TOO MUCH ABOUT DIFFERENT THINGS: MORE THAN HALF THE DAYS
1. FEELING NERVOUS, ANXIOUS, OR ON EDGE: SEVERAL DAYS
7. FEELING AFRAID AS IF SOMETHING AWFUL MIGHT HAPPEN: MORE THAN HALF THE DAYS

## 2024-04-15 NOTE — PROGRESS NOTES
[facility] [address] [address]  Phone: [phone] Fax [phone]  Tracey Monge MD  4/15/2024           Ms. Oliveira  is a 58 y.o.  year old  female patient who comes in to follow-up on issues related to depression.  She has noticed that the Zoloft has helped her depression but she does feel irritable at times.  She worries a lot as well.  She is not having thoughts of suicide.  She is struggled with this for a long time.  She has not had any side effects with the Zoloft.  She does tend to fall asleep at night watching TV but she thinks she was doing that before she started the Zoloft.      She also has trouble with loose stool and diarrhea.  It seems to happen 60% of the time.  This happened her whole life.  She is wondering what she can take for it.        4/15/2024     8:56 AM   PHQ-9    Little interest or pleasure in doing things 0   Feeling down, depressed, or hopeless 0   Trouble falling or staying asleep, or sleeping too much 1   Feeling tired or having little energy 0   Poor appetite or overeating 0   Feeling bad about yourself - or that you are a failure or have let yourself or your family down 0   Trouble concentrating on things, such as reading the newspaper or watching television 0   Moving or speaking so slowly that other people could have noticed. Or the opposite - being so fidgety or restless that you have been moving around a lot more than usual 0   Thoughts that you would be better off dead, or of hurting yourself in some way 0   PHQ-2 Score 0   PHQ-9 Total Score 1   If you checked off any problems, how difficult have these problems made it for you to do your work, take care of things at home, or get along with other people? 0            4/15/2024     8:00 AM   KAMLESH-7 SCREENING   Feeling nervous, anxious, or on edge Several days   Not being able to stop or control worrying Several days   Worrying too much about different things More than half

## 2024-07-15 ENCOUNTER — OFFICE VISIT (OUTPATIENT)
Dept: FAMILY MEDICINE CLINIC | Facility: CLINIC | Age: 59
End: 2024-07-15
Payer: COMMERCIAL

## 2024-07-15 VITALS
HEIGHT: 62 IN | WEIGHT: 184 LBS | OXYGEN SATURATION: 93 % | RESPIRATION RATE: 16 BRPM | TEMPERATURE: 97 F | SYSTOLIC BLOOD PRESSURE: 143 MMHG | DIASTOLIC BLOOD PRESSURE: 81 MMHG | HEART RATE: 71 BPM | BODY MASS INDEX: 33.86 KG/M2

## 2024-07-15 DIAGNOSIS — F51.01 PRIMARY INSOMNIA: ICD-10-CM

## 2024-07-15 DIAGNOSIS — F41.9 ANXIETY: ICD-10-CM

## 2024-07-15 DIAGNOSIS — F32.0 CURRENT MILD EPISODE OF MAJOR DEPRESSIVE DISORDER WITHOUT PRIOR EPISODE (HCC): Primary | ICD-10-CM

## 2024-07-15 DIAGNOSIS — R11.0 NAUSEA: ICD-10-CM

## 2024-07-15 DIAGNOSIS — Z12.31 ENCOUNTER FOR SCREENING MAMMOGRAM FOR MALIGNANT NEOPLASM OF BREAST: ICD-10-CM

## 2024-07-15 PROCEDURE — 99214 OFFICE O/P EST MOD 30 MIN: CPT | Performed by: FAMILY MEDICINE

## 2024-07-15 RX ORDER — ONDANSETRON 4 MG/1
4 TABLET, ORALLY DISINTEGRATING ORAL 3 TIMES DAILY PRN
Qty: 21 TABLET | Refills: 0 | Status: SHIPPED | OUTPATIENT
Start: 2024-07-15

## 2024-07-15 RX ORDER — TRAZODONE HYDROCHLORIDE 150 MG/1
150 TABLET ORAL NIGHTLY
Qty: 90 TABLET | Refills: 3 | Status: SHIPPED | OUTPATIENT
Start: 2024-07-15

## 2024-07-15 SDOH — ECONOMIC STABILITY: INCOME INSECURITY: HOW HARD IS IT FOR YOU TO PAY FOR THE VERY BASICS LIKE FOOD, HOUSING, MEDICAL CARE, AND HEATING?: NOT HARD AT ALL

## 2024-07-15 SDOH — ECONOMIC STABILITY: FOOD INSECURITY: WITHIN THE PAST 12 MONTHS, YOU WORRIED THAT YOUR FOOD WOULD RUN OUT BEFORE YOU GOT MONEY TO BUY MORE.: NEVER TRUE

## 2024-07-15 SDOH — ECONOMIC STABILITY: FOOD INSECURITY: WITHIN THE PAST 12 MONTHS, THE FOOD YOU BOUGHT JUST DIDN'T LAST AND YOU DIDN'T HAVE MONEY TO GET MORE.: NEVER TRUE

## 2024-07-15 ASSESSMENT — PATIENT HEALTH QUESTIONNAIRE - PHQ9
SUM OF ALL RESPONSES TO PHQ QUESTIONS 1-9: 1
SUM OF ALL RESPONSES TO PHQ QUESTIONS 1-9: 1
SUM OF ALL RESPONSES TO PHQ9 QUESTIONS 1 & 2: 0
SUM OF ALL RESPONSES TO PHQ QUESTIONS 1-9: 1
2. FEELING DOWN, DEPRESSED OR HOPELESS: NOT AT ALL
10. IF YOU CHECKED OFF ANY PROBLEMS, HOW DIFFICULT HAVE THESE PROBLEMS MADE IT FOR YOU TO DO YOUR WORK, TAKE CARE OF THINGS AT HOME, OR GET ALONG WITH OTHER PEOPLE: NOT DIFFICULT AT ALL
4. FEELING TIRED OR HAVING LITTLE ENERGY: SEVERAL DAYS
9. THOUGHTS THAT YOU WOULD BE BETTER OFF DEAD, OR OF HURTING YOURSELF: NOT AT ALL
1. LITTLE INTEREST OR PLEASURE IN DOING THINGS: NOT AT ALL
3. TROUBLE FALLING OR STAYING ASLEEP: NOT AT ALL
6. FEELING BAD ABOUT YOURSELF - OR THAT YOU ARE A FAILURE OR HAVE LET YOURSELF OR YOUR FAMILY DOWN: NOT AT ALL
8. MOVING OR SPEAKING SO SLOWLY THAT OTHER PEOPLE COULD HAVE NOTICED. OR THE OPPOSITE, BEING SO FIGETY OR RESTLESS THAT YOU HAVE BEEN MOVING AROUND A LOT MORE THAN USUAL: NOT AT ALL
SUM OF ALL RESPONSES TO PHQ QUESTIONS 1-9: 1
5. POOR APPETITE OR OVEREATING: NOT AT ALL
7. TROUBLE CONCENTRATING ON THINGS, SUCH AS READING THE NEWSPAPER OR WATCHING TELEVISION: NOT AT ALL

## 2024-07-15 NOTE — PROGRESS NOTES
[facility] [address] [address]  Phone: [phone] Fax [phone]  Tracey Monge MD  7/15/2024           Ms. Oliveira  is a 58 y.o.  year old  female patient who comes in to follow-up on issues related to depression and anxiety.  We had increased her Zoloft at her last visit and she is doing much better with it.  No thoughts of suicide.  She has a very stressful job and she thinks that is causing her fatigue.  She does need her trazodone refilled and sometimes she needs the Zofran so she would like that refilled.        7/15/2024     9:07 AM   PHQ-9    Little interest or pleasure in doing things 0   Feeling down, depressed, or hopeless 0   Trouble falling or staying asleep, or sleeping too much 0   Feeling tired or having little energy 1   Poor appetite or overeating 0   Feeling bad about yourself - or that you are a failure or have let yourself or your family down 0   Trouble concentrating on things, such as reading the newspaper or watching television 0   Moving or speaking so slowly that other people could have noticed. Or the opposite - being so fidgety or restless that you have been moving around a lot more than usual 0   Thoughts that you would be better off dead, or of hurting yourself in some way 0   PHQ-2 Score 0   PHQ-9 Total Score 1   If you checked off any problems, how difficult have these problems made it for you to do your work, take care of things at home, or get along with other people? 0      Ms. Oliveira  has  has a past medical history of Menopause and PMS (premenstrual syndrome).    Ms. Oliveira  has  has a past surgical history that includes orthopedic surgery (Right, 4/3/15);  section (); and gyn.    Ms. Oliveira   Current Outpatient Medications   Medication Sig Dispense Refill    ondansetron (ZOFRAN-ODT) 4 MG disintegrating tablet Take 1 tablet by mouth 3 times daily as needed for Nausea or Vomiting 21 tablet 0

## 2024-09-26 ENCOUNTER — OFFICE VISIT (OUTPATIENT)
Dept: FAMILY MEDICINE CLINIC | Facility: CLINIC | Age: 59
End: 2024-09-26
Payer: COMMERCIAL

## 2024-09-26 VITALS
TEMPERATURE: 97.8 F | BODY MASS INDEX: 34.21 KG/M2 | DIASTOLIC BLOOD PRESSURE: 76 MMHG | HEIGHT: 62 IN | SYSTOLIC BLOOD PRESSURE: 144 MMHG | RESPIRATION RATE: 16 BRPM | HEART RATE: 73 BPM | WEIGHT: 185.9 LBS | OXYGEN SATURATION: 91 %

## 2024-09-26 DIAGNOSIS — R35.0 URINARY FREQUENCY: Primary | ICD-10-CM

## 2024-09-26 DIAGNOSIS — N39.43 POST-VOID DRIBBLING: ICD-10-CM

## 2024-09-26 DIAGNOSIS — R35.0 URINARY FREQUENCY: ICD-10-CM

## 2024-09-26 LAB
BILIRUBIN, URINE, POC: NEGATIVE
BLOOD URINE, POC: NEGATIVE
GLUCOSE URINE, POC: NEGATIVE
KETONES, URINE, POC: NEGATIVE
LEUKOCYTE ESTERASE, URINE, POC: NEGATIVE
NITRITE, URINE, POC: NEGATIVE
PH, URINE, POC: 6 (ref 4.6–8)
PROTEIN,URINE, POC: NORMAL
SPECIFIC GRAVITY, URINE, POC: 1.02 (ref 1–1.03)
URINALYSIS CLARITY, POC: CLEAR
URINALYSIS COLOR, POC: NORMAL
UROBILINOGEN, POC: NORMAL

## 2024-09-26 PROCEDURE — 99213 OFFICE O/P EST LOW 20 MIN: CPT

## 2024-09-26 PROCEDURE — 81002 URINALYSIS NONAUTO W/O SCOPE: CPT

## 2024-09-26 ASSESSMENT — PATIENT HEALTH QUESTIONNAIRE - PHQ9
7. TROUBLE CONCENTRATING ON THINGS, SUCH AS READING THE NEWSPAPER OR WATCHING TELEVISION: NOT AT ALL
SUM OF ALL RESPONSES TO PHQ QUESTIONS 1-9: 0
10. IF YOU CHECKED OFF ANY PROBLEMS, HOW DIFFICULT HAVE THESE PROBLEMS MADE IT FOR YOU TO DO YOUR WORK, TAKE CARE OF THINGS AT HOME, OR GET ALONG WITH OTHER PEOPLE: NOT DIFFICULT AT ALL
SUM OF ALL RESPONSES TO PHQ QUESTIONS 1-9: 0
SUM OF ALL RESPONSES TO PHQ QUESTIONS 1-9: 0
8. MOVING OR SPEAKING SO SLOWLY THAT OTHER PEOPLE COULD HAVE NOTICED. OR THE OPPOSITE, BEING SO FIGETY OR RESTLESS THAT YOU HAVE BEEN MOVING AROUND A LOT MORE THAN USUAL: NOT AT ALL
6. FEELING BAD ABOUT YOURSELF - OR THAT YOU ARE A FAILURE OR HAVE LET YOURSELF OR YOUR FAMILY DOWN: NOT AT ALL
SUM OF ALL RESPONSES TO PHQ QUESTIONS 1-9: 0
3. TROUBLE FALLING OR STAYING ASLEEP: NOT AT ALL
SUM OF ALL RESPONSES TO PHQ QUESTIONS 1-9: 0
SUM OF ALL RESPONSES TO PHQ QUESTIONS 1-9: 0
5. POOR APPETITE OR OVEREATING: NOT AT ALL
2. FEELING DOWN, DEPRESSED OR HOPELESS: NOT AT ALL
1. LITTLE INTEREST OR PLEASURE IN DOING THINGS: NOT AT ALL
SUM OF ALL RESPONSES TO PHQ9 QUESTIONS 1 & 2: 0
1. LITTLE INTEREST OR PLEASURE IN DOING THINGS: NOT AT ALL
4. FEELING TIRED OR HAVING LITTLE ENERGY: NOT AT ALL
9. THOUGHTS THAT YOU WOULD BE BETTER OFF DEAD, OR OF HURTING YOURSELF: NOT AT ALL
SUM OF ALL RESPONSES TO PHQ9 QUESTIONS 1 & 2: 0
2. FEELING DOWN, DEPRESSED OR HOPELESS: NOT AT ALL
SUM OF ALL RESPONSES TO PHQ QUESTIONS 1-9: 0
SUM OF ALL RESPONSES TO PHQ QUESTIONS 1-9: 0

## 2024-09-30 LAB
BACTERIA SPEC CULT: NORMAL
BACTERIA SPEC CULT: NORMAL
SERVICE CMNT-IMP: NORMAL

## 2025-01-05 ENCOUNTER — TELEPHONE (OUTPATIENT)
Dept: FAMILY MEDICINE CLINIC | Facility: CLINIC | Age: 60
End: 2025-01-05

## 2025-01-05 DIAGNOSIS — Z13.0 SCREENING, ANEMIA, DEFICIENCY, IRON: ICD-10-CM

## 2025-01-05 DIAGNOSIS — Z00.00 ROUTINE GENERAL MEDICAL EXAMINATION AT A HEALTH CARE FACILITY: ICD-10-CM

## 2025-01-05 DIAGNOSIS — E78.00 HIGH CHOLESTEROL: ICD-10-CM

## 2025-01-05 DIAGNOSIS — Z13.1 SCREENING FOR DIABETES MELLITUS: Primary | ICD-10-CM

## 2025-01-06 ENCOUNTER — LAB (OUTPATIENT)
Dept: FAMILY MEDICINE CLINIC | Facility: CLINIC | Age: 60
End: 2025-01-06

## 2025-01-06 DIAGNOSIS — Z13.1 SCREENING FOR DIABETES MELLITUS: ICD-10-CM

## 2025-01-06 DIAGNOSIS — Z13.0 SCREENING, ANEMIA, DEFICIENCY, IRON: ICD-10-CM

## 2025-01-06 DIAGNOSIS — Z00.00 ROUTINE GENERAL MEDICAL EXAMINATION AT A HEALTH CARE FACILITY: ICD-10-CM

## 2025-01-06 DIAGNOSIS — E78.00 HIGH CHOLESTEROL: ICD-10-CM

## 2025-01-06 LAB
ALBUMIN SERPL-MCNC: 3.9 G/DL (ref 3.2–4.6)
ALBUMIN/GLOB SERPL: 1.5 (ref 1–1.9)
ALP SERPL-CCNC: 116 U/L (ref 35–104)
ALT SERPL-CCNC: 21 U/L (ref 8–45)
ANION GAP SERPL CALC-SCNC: 9 MMOL/L (ref 7–16)
AST SERPL-CCNC: 23 U/L (ref 15–37)
BASOPHILS # BLD: 0.1 K/UL (ref 0–0.2)
BASOPHILS NFR BLD: 1 % (ref 0–2)
BILIRUB SERPL-MCNC: 0.4 MG/DL (ref 0–1.2)
BUN SERPL-MCNC: 7 MG/DL (ref 8–23)
CALCIUM SERPL-MCNC: 9.2 MG/DL (ref 8.8–10.2)
CHLORIDE SERPL-SCNC: 103 MMOL/L (ref 98–107)
CHOLEST SERPL-MCNC: 208 MG/DL (ref 0–200)
CO2 SERPL-SCNC: 29 MMOL/L (ref 20–29)
CREAT SERPL-MCNC: 0.6 MG/DL (ref 0.6–1.1)
DIFFERENTIAL METHOD BLD: ABNORMAL
EOSINOPHIL # BLD: 0.1 K/UL (ref 0–0.8)
EOSINOPHIL NFR BLD: 2 % (ref 0.5–7.8)
ERYTHROCYTE [DISTWIDTH] IN BLOOD BY AUTOMATED COUNT: 11.7 % (ref 11.9–14.6)
GLOBULIN SER CALC-MCNC: 2.6 G/DL (ref 2.3–3.5)
GLUCOSE SERPL-MCNC: 102 MG/DL (ref 70–99)
HCT VFR BLD AUTO: 43.9 % (ref 35.8–46.3)
HDLC SERPL-MCNC: 44 MG/DL (ref 40–60)
HDLC SERPL: 4.7 (ref 0–5)
HGB BLD-MCNC: 14.5 G/DL (ref 11.7–15.4)
IMM GRANULOCYTES # BLD AUTO: 0 K/UL (ref 0–0.5)
IMM GRANULOCYTES NFR BLD AUTO: 0 % (ref 0–5)
LDLC SERPL CALC-MCNC: 136 MG/DL (ref 0–100)
LYMPHOCYTES # BLD: 1.7 K/UL (ref 0.5–4.6)
LYMPHOCYTES NFR BLD: 30 % (ref 13–44)
MCH RBC QN AUTO: 31.9 PG (ref 26.1–32.9)
MCHC RBC AUTO-ENTMCNC: 33 G/DL (ref 31.4–35)
MCV RBC AUTO: 96.5 FL (ref 82–102)
MONOCYTES # BLD: 0.4 K/UL (ref 0.1–1.3)
MONOCYTES NFR BLD: 6 % (ref 4–12)
NEUTS SEG # BLD: 3.6 K/UL (ref 1.7–8.2)
NEUTS SEG NFR BLD: 61 % (ref 43–78)
NRBC # BLD: 0 K/UL (ref 0–0.2)
PLATELET # BLD AUTO: 244 K/UL (ref 150–450)
PMV BLD AUTO: 10.6 FL (ref 9.4–12.3)
POTASSIUM SERPL-SCNC: 4 MMOL/L (ref 3.5–5.1)
PROT SERPL-MCNC: 6.5 G/DL (ref 6.3–8.2)
RBC # BLD AUTO: 4.55 M/UL (ref 4.05–5.2)
SODIUM SERPL-SCNC: 141 MMOL/L (ref 136–145)
TRIGL SERPL-MCNC: 136 MG/DL (ref 0–150)
VLDLC SERPL CALC-MCNC: 27 MG/DL (ref 6–23)
WBC # BLD AUTO: 5.8 K/UL (ref 4.3–11.1)

## 2025-01-13 ENCOUNTER — OFFICE VISIT (OUTPATIENT)
Dept: FAMILY MEDICINE CLINIC | Facility: CLINIC | Age: 60
End: 2025-01-13

## 2025-01-13 VITALS
HEART RATE: 81 BPM | TEMPERATURE: 97.7 F | DIASTOLIC BLOOD PRESSURE: 76 MMHG | SYSTOLIC BLOOD PRESSURE: 126 MMHG | WEIGHT: 184.6 LBS | HEIGHT: 62 IN | BODY MASS INDEX: 33.97 KG/M2 | OXYGEN SATURATION: 94 % | RESPIRATION RATE: 16 BRPM

## 2025-01-13 DIAGNOSIS — Z12.31 ENCOUNTER FOR SCREENING MAMMOGRAM FOR MALIGNANT NEOPLASM OF BREAST: ICD-10-CM

## 2025-01-13 DIAGNOSIS — Z13.1 SCREENING FOR DIABETES MELLITUS: ICD-10-CM

## 2025-01-13 DIAGNOSIS — F51.01 PRIMARY INSOMNIA: ICD-10-CM

## 2025-01-13 DIAGNOSIS — F32.0 CURRENT MILD EPISODE OF MAJOR DEPRESSIVE DISORDER WITHOUT PRIOR EPISODE (HCC): ICD-10-CM

## 2025-01-13 DIAGNOSIS — Z13.0 SCREENING, ANEMIA, DEFICIENCY, IRON: ICD-10-CM

## 2025-01-13 DIAGNOSIS — Z13.6 SCREENING FOR CARDIOVASCULAR CONDITION: ICD-10-CM

## 2025-01-13 DIAGNOSIS — F41.9 ANXIETY: ICD-10-CM

## 2025-01-13 DIAGNOSIS — Z00.00 ROUTINE GENERAL MEDICAL EXAMINATION AT A HEALTH CARE FACILITY: Primary | ICD-10-CM

## 2025-01-13 RX ORDER — SERTRALINE HYDROCHLORIDE 100 MG/1
TABLET, FILM COATED ORAL
Qty: 135 TABLET | Refills: 3 | Status: SHIPPED | OUTPATIENT
Start: 2025-01-13

## 2025-01-13 RX ORDER — TRAZODONE HYDROCHLORIDE 150 MG/1
150 TABLET ORAL NIGHTLY
Qty: 90 TABLET | Refills: 3 | Status: SHIPPED | OUTPATIENT
Start: 2025-01-13

## 2025-01-13 SDOH — ECONOMIC STABILITY: FOOD INSECURITY: WITHIN THE PAST 12 MONTHS, YOU WORRIED THAT YOUR FOOD WOULD RUN OUT BEFORE YOU GOT MONEY TO BUY MORE.: NEVER TRUE

## 2025-01-13 SDOH — ECONOMIC STABILITY: FOOD INSECURITY: WITHIN THE PAST 12 MONTHS, THE FOOD YOU BOUGHT JUST DIDN'T LAST AND YOU DIDN'T HAVE MONEY TO GET MORE.: NEVER TRUE

## 2025-01-13 ASSESSMENT — PATIENT HEALTH QUESTIONNAIRE - PHQ9
6. FEELING BAD ABOUT YOURSELF - OR THAT YOU ARE A FAILURE OR HAVE LET YOURSELF OR YOUR FAMILY DOWN: NOT AT ALL
SUM OF ALL RESPONSES TO PHQ QUESTIONS 1-9: 0
4. FEELING TIRED OR HAVING LITTLE ENERGY: NOT AT ALL
SUM OF ALL RESPONSES TO PHQ QUESTIONS 1-9: 0
SUM OF ALL RESPONSES TO PHQ QUESTIONS 1-9: 0
2. FEELING DOWN, DEPRESSED OR HOPELESS: NOT AT ALL
SUM OF ALL RESPONSES TO PHQ QUESTIONS 1-9: 0
7. TROUBLE CONCENTRATING ON THINGS, SUCH AS READING THE NEWSPAPER OR WATCHING TELEVISION: NOT AT ALL
10. IF YOU CHECKED OFF ANY PROBLEMS, HOW DIFFICULT HAVE THESE PROBLEMS MADE IT FOR YOU TO DO YOUR WORK, TAKE CARE OF THINGS AT HOME, OR GET ALONG WITH OTHER PEOPLE: NOT DIFFICULT AT ALL
1. LITTLE INTEREST OR PLEASURE IN DOING THINGS: NOT AT ALL
5. POOR APPETITE OR OVEREATING: NOT AT ALL
SUM OF ALL RESPONSES TO PHQ9 QUESTIONS 1 & 2: 0
8. MOVING OR SPEAKING SO SLOWLY THAT OTHER PEOPLE COULD HAVE NOTICED. OR THE OPPOSITE, BEING SO FIGETY OR RESTLESS THAT YOU HAVE BEEN MOVING AROUND A LOT MORE THAN USUAL: NOT AT ALL
3. TROUBLE FALLING OR STAYING ASLEEP: NOT AT ALL
9. THOUGHTS THAT YOU WOULD BE BETTER OFF DEAD, OR OF HURTING YOURSELF: NOT AT ALL